# Patient Record
Sex: MALE | Race: WHITE
[De-identification: names, ages, dates, MRNs, and addresses within clinical notes are randomized per-mention and may not be internally consistent; named-entity substitution may affect disease eponyms.]

---

## 2021-08-12 NOTE — CONS
CONSULTATION



REASON FOR CONSULT:

Hypernatremia.



HISTORY OF PRESENT ILLNESS:

The patient is an 80-year-old male who was admitted from the nursing home as he was

found unresponsive.  It is difficult to obtain any history from the patient as he is

not arousable.  Family is not present at bedside.  The patient's sodium was noted to be

150 mEq/L.  He is currently unarousable but does withdraw to pain.  Chest x-ray was

suggestive of CHF, however, clinically patient is lying in bed comfortably with no

significant shortness of breath. His O2 sats 100% on 2 L nasal cannula.  Heart rate

noted to be 126 and 128 beats per minute.  Blood pressure is on the lower side with

systolic around 103 mmHg.  Earlier it was 99 systolic.  The patient has an indwelling

Wahl catheter which was just placed.  There is no history of fever or diarrhea noted.



PAST MEDICAL HISTORY:

Obtained from chart review, type 2 diabetes, dementia, CHF, atrial fibrillation.



SOCIAL HISTORY:

Negative for smoking, drug abuse or alcohol abuse.  The patient is currently at the

nursing home prior to admission.  Home medications included aspirin, Aricept,

Jardiance, Lopressor, potassium, Zocor, lisinopril, metformin.



ALLERGIES:

INCLUDE AMOXICILLIN.



REVIEW OF SYSTEMS:

Review of systems cannot be obtained mostly as per HPI.



EXAMINATION:

Patient is unarousable, although he withdraws to pain.  He does not open his eyes.

Blood pressure was 103/74, heart rate 127 per minute. He is afebrile.

Examination of the heart S1, S2.

Examination of the lungs, bilateral breath sounds are heard.

Abdomen is soft, nontender.

Examination of lower extremities shows no evidence of edema.

CNS exam shows patient only withdraws to pain.



LABS:

Show sodium 150, potassium 3.8, chloride 115, CO2 is 28, BUN 39, creatinine 1.09.

Lactic acid level 2.1, troponin 0.14.  Repeat lactic acid 3.5.



ASSESSMENT:

1. Hypernatremia associated with free water deficit.  We will start D5W.

2. Hypotension.  Rule out underlying sepsis.  UA shows 9 WBCs with 1+ protein.  The

    patient is maintained on empiric antibiotics.  Chest x-ray in the ER shows

    pulmonary vascular congestion, although clinically patient does not appear to be

    volume overloaded.

3. Mental status changes, associated with underlying dementia as well as electrolyte

    disturbance.

4. Chronic kidney disease.  Baseline creatinine appears to be around 1.09.  We do not

    have any prior labs available for comparison.

5. Lactic acidosis, possibly related to hypotension, hypoperfusion.  The patient is

    also on metformin which is currently on hold.



PLAN:

Start D5W at 70 mL an hour.  Repeat sodium at about 2:00 pm and 1 L of fluid bolus if

patient is hypotensive.



Thank you for this consultation.  We will continue to follow the patient with you

during his hospitalization.





MMALINL / SCOTTN: [id number] / Job#: [id number]

## 2021-08-12 NOTE — P.CNPUL
History of Present Illness


Consult date: 08/12/21


Requesting physician: Migue Schmitt


Reason for consult: abnormal CXR/CT


Chief complaint: Altered mental status


History of present illness: 





This is an 80-year-old male patient who was brought in from [facility] 

with altered mental status.  Apparently had previously been a no code patient 

but was moved back to a full code per his wife.  He was brought in after being 

found unresponsive at the nursing home.  Unknown length of time from his altered

mental status.  He is unresponsive in the emergency room and unable to provide 

any history.  Chest x-ray revealed changes of congestive heart failure.  EKG 

reveals sinus tachycardia with previous inferior wall myocardial infarction.  

White count 15.2.  Hemoglobin 13.7.  Sodium 150.  Potassium 3.8.  Chloride 115. 

Bicarb 28.  BUN 39.  Creatinine 1.09.  Glucose 322.  He was initiated on 0.9 

normal saline at 1:30 ML's per hour.  Antibiotics in the form of ceftriaxone and

azithromycin.  He received Lasix 40 mg IVP 1.  He is seen today in consultation

on the selective care unit.  He is currently unresponsive.  No response to loud 

verbal stimuli.  No response to painful stimuli.  Stat ABGs were ordered and was

found to have a pO2 of 149, pCO2 43, pH 7.45 on 30% FiO2.  Stat CT scan of the 

brain revealed degenerative and nonspecific white matter changes most typical 

emote ischemia.  Greater central ventricular dilation, can be associated with 

normal pressure hydrocephalus.  Neuro consult pending.





Review of Systems


ROS unobtainable: due to mental status





Past Medical History


Past Medical History: Atrial Fibrillation, Heart Failure, Dementia, Diabetes [place]

litus


History of Any Multi-Drug Resistant Organisms: None Reported


Past Psychological History: No Psychological Hx Reported


Smoking Status: Never smoker


Past Alcohol Use History: None Reported


Past Drug Use History: None Reported





Medications and Allergies


                                Home Medications











 Medication  Instructions  Recorded  Confirmed  Type


 


Aspirin 81 mg DAILY 08/12/21 08/12/21 History


 


Donepezil HCl [Aricept] 10 mg PO BID@0700,2000 08/12/21 08/12/21 History


 


Empagliflozin [Jardiance] 25 mg PO DAILY 08/12/21 08/12/21 History


 


Healthshake 1 dose PO TID@0800,1300,1900 08/12/21 08/12/21 History


 


Memantine HCl 10 mg PO BID@0700,2000 08/12/21 08/12/21 History


 


Metoprolol Tartrate [Lopressor] 75 mg PO TID@0700,1300,2000 08/12/21 08/12/21 

History


 


Potassium Chloride ER [K-Dur 20] 20 mg DAILY 08/12/21 08/12/21 History


 


Simvastatin [Zocor] 40 mg PO HS@2000 08/12/21 08/12/21 History


 


lisinopriL 10 mg PO AS DIRECTED 08/12/21 08/12/21 History


 


metFORMIN  mg PO BID 08/12/21 08/12/21 History








                                    Allergies











Allergy/AdvReac Type Severity Reaction Status Date / Time


 


amoxicillin Allergy  Unknown Verified 08/12/21 07:17














Physical Exam


Vitals: 


                                   Vital Signs











  Temp Pulse Pulse Resp BP BP Pulse Ox


 


 08/12/21 05:39    127 H    


 


 08/12/21 05:16  97.8 F   127 H  20   103/74  100


 


 08/12/21 04:56   126 H   22  119/89   100


 


 08/12/21 04:00   128 H   22  118/81   100


 


 08/12/21 03:30   127 H   22  100/69   100


 


 08/12/21 02:30   126 H   22  116/76   99


 


 08/12/21 02:00   126 H   22  112/75   99


 


 08/12/21 01:41  97.1 F L  126 H   22  113/76   97








                                Intake and Output











 08/11/21 08/12/21 08/12/21





 22:59 06:59 14:59


 


Other:   


 


  Voiding Method  Indwelling Catheter 


 


  # Bowel Movements  1 


 


  Weight  63.1 kg 














GENERAL EXAM: Obtunded 80-year-old male patient on 2 L nasal cannula.


HEAD: Normocephalic.


EYES: Normal reaction of pupils, equal size.


NOSE: Clear with pink turbinates.


THROAT: No erythema or exudates.


NECK: No masses, no JVD.


CHEST: No chest wall deformity.


LUNGS: Equal air entry with bilateral crackles in the posterior bases.


CVS: S1 and S2 normal with no audible murmur, regular rhythm.


ABDOMEN: No hepatosplenomegaly, normal bowel sounds, no guarding or rigidity.


SPINE: No scoliosis or deformity


SKIN: No rashes


CENTRAL NERVOUS SYSTEM: Obtunded, tone is normal in all 4 extremities.


EXTREMITIES: There is no peripheral edema.  No clubbing, no cyanosis.  

Peripheral pulses are intact.





Results





- Laboratory Findings


CBC and BMP: 


                                 08/12/21 01:56





                                 08/12/21 08:57


ABG











ABG pH  7.45  (7.35-7.45)   08/12/21  09:03    


 


ABG pCO2  43 mmHg (35-45)   08/12/21  09:03    


 


ABG pO2  149 mmHg ()  H  08/12/21  09:03    


 


ABG O2 Saturation  99.3 % (94-97)  H  08/12/21  09:03    





PT/INR, D-dimer











PT  11.5 sec (9.0-12.0)   08/12/21  01:56    


 


INR  1.1  (<1.2)   08/12/21  01:56    








Abnormal lab findings: 


                                  Abnormal Labs











  08/12/21 08/12/21 08/12/21





  01:56 01:56 01:56


 


WBC  15.2 H  


 


MCHC  30.4 L  


 


RDW  16.4 H  


 


Neutrophils #  13.5 H  


 


ABG pO2   


 


ABG HCO3   


 


ABG Total CO2   


 


ABG O2 Saturation   


 


Sodium   


 


Potassium    5.3 H


 


Chloride    114 H


 


BUN    37 H


 


Glucose    331 H


 


POC Glucose (mg/dL)   


 


Plasma Lactic Acid Braden   


 


Calcium   


 


Magnesium    2.4 H


 


Alkaline Phosphatase    132 H


 


Creatine Kinase    22 L


 


Troponin I   


 


Total Protein    5.9 L


 


Albumin    2.5 L


 


Urine Protein   1+ H 


 


Urine Glucose (UA)   4+ H 


 


Urine Blood   Trace H 


 


Urine RBC   8 H 


 


Urine WBC   9 H 


 


Urine Mucus   Rare H 














  08/12/21 08/12/21 08/12/21





  01:56 01:56 06:03


 


WBC   


 


MCHC   


 


RDW   


 


Neutrophils #   


 


ABG pO2   


 


ABG HCO3   


 


ABG Total CO2   


 


ABG O2 Saturation   


 


Sodium   


 


Potassium   


 


Chloride   


 


BUN   


 


Glucose   


 


POC Glucose (mg/dL)    386 H


 


Plasma Lactic Acid Braden  2.9 H*  


 


Calcium   


 


Magnesium   


 


Alkaline Phosphatase   


 


Creatine Kinase   


 


Troponin I   0.132 H* 


 


Total Protein   


 


Albumin   


 


Urine Protein   


 


Urine Glucose (UA)   


 


Urine Blood   


 


Urine RBC   


 


Urine WBC   


 


Urine Mucus   














  08/12/21 08/12/21 08/12/21





  06:04 06:04 08:57


 


WBC   


 


MCHC   


 


RDW   


 


Neutrophils #   


 


ABG pO2   


 


ABG HCO3   


 


ABG Total CO2   


 


ABG O2 Saturation   


 


Sodium    150 H


 


Potassium   


 


Chloride    115 H


 


BUN    39 H


 


Glucose    322 H


 


POC Glucose (mg/dL)   


 


Plasma Lactic Acid Braden   2.1 H* 


 


Calcium    8.3 L


 


Magnesium   


 


Alkaline Phosphatase   


 


Creatine Kinase   


 


Troponin I  0.131 H*  


 


Total Protein   


 


Albumin   


 


Urine Protein   


 


Urine Glucose (UA)   


 


Urine Blood   


 


Urine RBC   


 


Urine WBC   


 


Urine Mucus   














  08/12/21





  09:03


 


WBC 


 


MCHC 


 


RDW 


 


Neutrophils # 


 


ABG pO2  149 H


 


ABG HCO3  29 H


 


ABG Total CO2  31 H


 


ABG O2 Saturation  99.3 H


 


Sodium 


 


Potassium 


 


Chloride 


 


BUN 


 


Glucose 


 


POC Glucose (mg/dL) 


 


Plasma Lactic Acid Braden 


 


Calcium 


 


Magnesium 


 


Alkaline Phosphatase 


 


Creatine Kinase 


 


Troponin I 


 


Total Protein 


 


Albumin 


 


Urine Protein 


 


Urine Glucose (UA) 


 


Urine Blood 


 


Urine RBC 


 


Urine WBC 


 


Urine Mucus 














- Diagnostic Findings


Chest x-ray: image reviewed





Assessment and Plan


Assessment: 





1 Altered mental status of unclear etiology, unsure of baseline computed tomogra

phy scan of the brain reveals degenerative and nonspecific white matter changes 

most: Remote ischemia.  Greater central vent jugular dilation can be associated 

with normal pressure hydrocephalus.  Neurology consult pending.





2 Acute hypoxemic respiratory failure secondary to congestive heart failure, 

echocardiogram pending





3 Troponin leak





4 Hypernatremia





5 Hyperkalemia, improved





6 Hyperglycemia





7 Nursing home resident





Plan:





The patient was seen and evaluated by Dr. Pastor


Computed tomography scan of the brain and ABGs were ordered stat and reviewed


Chest x-ray and labs reviewed


Procalcitonin pending, currently on ceftriaxone and azithromycin


Discontinue 0.9 normal saline, change to D5W IV at 75 ML's per hour


Echocardiogram pending


Prognosis is poor


Unclear of his baseline status


Apparently had previously been a DNR/DNI CODE STATUS


Awaiting family members to arrive for further information and clarification





I, the cosigning physician, performed a history & physical examination of the 

patient. Lungs sounds with crackles bilateral bases.  Maintaining good O2 satu

rations in the 90s on 2 L/m per nasal cannula. I discussed the assessment and 

plan of care with my nurse practitioner, Suzan Dallas. I attest to the above 

consultation as dictated by her.


Time with Patient: Greater than 30

## 2021-08-12 NOTE — P.HPIM
History of Present Illness





Patient is a 80-year-old the male came in because patient is barely responsive. 

When I valid the patient patient does live ache answering minimal the questions.

 Patient is a nursing home resident.  Patient was transferred to nursing home 

after his inpatient hospitalization at an outside facility.  I'm unable to get 

much history from wife either.  As per the wife patient was eating patient 

became much less responsive and was sent in to the hospital patient had a chest 

x-ray, which looks like heart failure because of which I ordered a BNP which is 

highly elevated to 23,000 patient will be given a dose of Lasix.  IV fluids at 

this time.  Patient also found to have elevated white blood glucose of 322.  

Patient is receiving normal saline at 1 36/h which will be discontinued at this 

time patient was later switched to D5 half-normal saline which will risk and 

urine as well.  Patient had a computed tomography scan of the head which 

revealed degenerative changes with nonspecific white matter changes.  Patient 

does haven't by dilatation because of age-related went up to dilatation and 

probably dementia.  Neurology will evaluate the patient as well











REVIEW OF SYSTEMS: 


Unable to obtain due to his clinical condition








PHYSICAL EXAMINATION: 





GENERAL: Patient is bit arousable now and response now I believe he is answering

appropriately but unable to is his orientation, not in any acute distress.  Thin

built cachectic 


HEENT: Pupils are round and equally reacting to light. EOMI. No scleral icterus.

No conjunctival pallor. Normocephalic, atraumatic. No pharyngeal erythema. No 

thyromegaly. 


CARDIOVASCULAR: S1 and S2 present. No murmurs, rubs, or gallops. 


PULMONARY: Chest is clear to auscultation, no wheezing or crackles. 


ABDOMEN: Soft, nontender, nondistended, normoactive bowel sounds. No palpable 

organomegaly. 


MUSCULOSKELETAL: No joint swelling or deformity.


EXTREMITIES: No cyanosis, clubbing, or pedal edema. 


NEUROLOGICAL: Unable to assess mental status as mentioned above arousable and 

responsive


SKIN: No rashes. 





Assessment and plan


-Altered mental status, state of unresponsiveness: Probably encephalopathy 

related to volume overload considering his BNP and chest x-ray findings, IV 

fluids were discontinued and patient will be given a dose of Lasix, no clear 

evidence of infection patient is being treated for pneumonia but possibility of 

that is low if patient improves with Lasix and discontinue the antibiotics, 

patient is presently on azithromycin and Rocephin


-Acute hypoxic respiratory failure secondary to congestive heart failure 

echocardiogram is pending IV Lasix as mentioned above


-Mildly elevated troponin secondary to heart failure.


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


-Type 2 diabetes mellitus uncontrolled elevated blood sugars patient will be 

resumed on home regimen titration depending on his blood sugars.


-Known history of atrial fibrillation presently in atrial flutter patient will 

be restarted back on his metoprolol patient was given IV once he is able to 

tolerate by mouth diet patient will be started back on that oral metoprolol.  

Patient is presently not on any anticoagulation


-Possible dementia from the wife's history patient appears to have advanced 

dementia


-Leukocytosis reactive so far no clear source of infection


DVT prophylaxis: Patient will be started on subcutaneous heparin


-Patient is DO NOT RESUSCITATE discussed with the wife.





Past Medical History


Past Medical History: Atrial Fibrillation, Heart Failure, Dementia, Diabetes 

[place]


History of Any Multi-Drug Resistant Organisms: None Reported


Past Surgical History: No Surgical Hx Reported


Past Anesthesia/Blood Transfusion Reactions: No Reported Reaction


Past Psychological History: No Psychological Hx Reported


Smoking Status: Unknown if ever smoked


Past Alcohol Use History: None Reported


Past Drug Use History: None Reported





Medications and Allergies


                                Home Medications











 Medication  Instructions  Recorded  Confirmed  Type


 


Aspirin 81 mg DAILY 08/12/21 08/12/21 History


 


Donepezil HCl [Aricept] 10 mg PO BID@0700,2000 08/12/21 08/12/21 History


 


Empagliflozin [Jardiance] 25 mg PO DAILY 08/12/21 08/12/21 History


 


Healthshake 1 dose PO TID@0800,1300,1900 08/12/21 08/12/21 History


 


Memantine HCl 10 mg PO BID@0700,2000 08/12/21 08/12/21 History


 


Metoprolol Tartrate [Lopressor] 75 mg PO TID@0700,1300,2000 08/12/21 08/12/21 

History


 


Potassium Chloride ER [K-Dur 20] 20 mg DAILY 08/12/21 08/12/21 History


 


Simvastatin [Zocor] 40 mg PO HS@2000 08/12/21 08/12/21 History


 


lisinopriL 10 mg PO AS DIRECTED 08/12/21 08/12/21 History


 


metFORMIN  mg PO BID 08/12/21 08/12/21 History








                                    Allergies











Allergy/AdvReac Type Severity Reaction Status Date / Time


 


amoxicillin Allergy  Unknown Verified 08/12/21 07:17














Physical Exam


Vitals: 


                                   Vital Signs











  Temp Pulse Pulse Resp BP BP Pulse Ox


 


 08/12/21 14:00     18   


 


 08/12/21 12:00  97.5 F L   66  20   90/55  95


 


 08/12/21 11:36  97.5 F L   66  18   90/55  95


 


 08/12/21 08:00  97.8 F   120 H  20   92/64  100


 


 08/12/21 05:39    127 H    


 


 08/12/21 05:16  97.8 F   127 H  20   103/74  100


 


 08/12/21 04:56   126 H   22  119/89   100


 


 08/12/21 04:00   128 H   22  118/81   100


 


 08/12/21 03:30   127 H   22  100/69   100


 


 08/12/21 02:30   126 H   22  116/76   99


 


 08/12/21 02:00   126 H   22  112/75   99


 


 08/12/21 01:41  97.1 F L  126 H   22  113/76   97








                                Intake and Output











 08/11/21 08/12/21 08/12/21





 22:59 06:59 14:59


 


Intake Total   0


 


Output Total   1200


 


Balance   -1200


 


Intake:   


 


  Oral   0


 


Output:   


 


  Urine   1200


 


Other:   


 


  Voiding Method  Indwelling Catheter Indwelling Catheter


 


  # Bowel Movements  1 


 


  Weight  63.1 kg 63.1 kg














Results


CBC & Chem 7: 


                                 08/12/21 01:56





                                 08/12/21 12:49


Labs: 


                  Abnormal Lab Results - Last 24 Hours (Table)











  08/12/21 08/12/21 08/12/21 Range/Units





  01:56 01:56 01:56 


 


WBC  15.2 H    (3.8-10.6)  k/uL


 


MCHC  30.4 L    (31.0-37.0)  g/dL


 


RDW  16.4 H    (11.5-15.5)  %


 


Neutrophils #  13.5 H    (1.3-7.7)  k/uL


 


ABG pO2     ()  mmHg


 


ABG HCO3     (21-25)  mmol/L


 


ABG Total CO2     (19-24)  mmol/L


 


ABG O2 Saturation     (94-97)  %


 


Sodium     (137-145)  mmol/L


 


Potassium    5.3 H  (3.5-5.1)  mmol/L


 


Chloride    114 H  ()  mmol/L


 


BUN    37 H  (9-20)  mg/dL


 


Glucose    331 H  (74-99)  mg/dL


 


POC Glucose (mg/dL)     (75-99)  mg/dL


 


Plasma Lactic Acid Braden     (0.7-2.0)  mmol/L


 


Calcium     (8.4-10.2)  mg/dL


 


Magnesium    2.4 H  (1.6-2.3)  mg/dL


 


Alkaline Phosphatase    132 H  ()  U/L


 


Creatine Kinase    22 L  ()  U/L


 


Troponin I     (0.000-0.034)  ng/mL


 


Total Protein    5.9 L  (6.3-8.2)  g/dL


 


Albumin    2.5 L  (3.5-5.0)  g/dL


 


Urine Protein   1+ H   (Negative)  


 


Urine Glucose (UA)   4+ H   (Negative)  


 


Urine Blood   Trace H   (Negative)  


 


Urine RBC   8 H   (0-5)  /hpf


 


Urine WBC   9 H   (0-5)  /hpf


 


Urine Mucus   Rare H   (None)  /hpf














  08/12/21 08/12/21 08/12/21 Range/Units





  01:56 01:56 06:03 


 


WBC     (3.8-10.6)  k/uL


 


MCHC     (31.0-37.0)  g/dL


 


RDW     (11.5-15.5)  %


 


Neutrophils #     (1.3-7.7)  k/uL


 


ABG pO2     ()  mmHg


 


ABG HCO3     (21-25)  mmol/L


 


ABG Total CO2     (19-24)  mmol/L


 


ABG O2 Saturation     (94-97)  %


 


Sodium     (137-145)  mmol/L


 


Potassium     (3.5-5.1)  mmol/L


 


Chloride     ()  mmol/L


 


BUN     (9-20)  mg/dL


 


Glucose     (74-99)  mg/dL


 


POC Glucose (mg/dL)    386 H  (75-99)  mg/dL


 


Plasma Lactic Acid Braden  2.9 H*    (0.7-2.0)  mmol/L


 


Calcium     (8.4-10.2)  mg/dL


 


Magnesium     (1.6-2.3)  mg/dL


 


Alkaline Phosphatase     ()  U/L


 


Creatine Kinase     ()  U/L


 


Troponin I   0.132 H*   (0.000-0.034)  ng/mL


 


Total Protein     (6.3-8.2)  g/dL


 


Albumin     (3.5-5.0)  g/dL


 


Urine Protein     (Negative)  


 


Urine Glucose (UA)     (Negative)  


 


Urine Blood     (Negative)  


 


Urine RBC     (0-5)  /hpf


 


Urine WBC     (0-5)  /hpf


 


Urine Mucus     (None)  /hpf














  08/12/21 08/12/21 08/12/21 Range/Units





  06:04 06:04 08:57 


 


WBC     (3.8-10.6)  k/uL


 


MCHC     (31.0-37.0)  g/dL


 


RDW     (11.5-15.5)  %


 


Neutrophils #     (1.3-7.7)  k/uL


 


ABG pO2     ()  mmHg


 


ABG HCO3     (21-25)  mmol/L


 


ABG Total CO2     (19-24)  mmol/L


 


ABG O2 Saturation     (94-97)  %


 


Sodium     (137-145)  mmol/L


 


Potassium     (3.5-5.1)  mmol/L


 


Chloride     ()  mmol/L


 


BUN     (9-20)  mg/dL


 


Glucose     (74-99)  mg/dL


 


POC Glucose (mg/dL)     (75-99)  mg/dL


 


Plasma Lactic Acid Braden   2.1 H*   (0.7-2.0)  mmol/L


 


Calcium     (8.4-10.2)  mg/dL


 


Magnesium     (1.6-2.3)  mg/dL


 


Alkaline Phosphatase     ()  U/L


 


Creatine Kinase     ()  U/L


 


Troponin I  0.131 H*   0.141 H*  (0.000-0.034)  ng/mL


 


Total Protein     (6.3-8.2)  g/dL


 


Albumin     (3.5-5.0)  g/dL


 


Urine Protein     (Negative)  


 


Urine Glucose (UA)     (Negative)  


 


Urine Blood     (Negative)  


 


Urine RBC     (0-5)  /hpf


 


Urine WBC     (0-5)  /hpf


 


Urine Mucus     (None)  /hpf














  08/12/21 08/12/21 08/12/21 Range/Units





  08:57 09:03 09:47 


 


WBC     (3.8-10.6)  k/uL


 


MCHC     (31.0-37.0)  g/dL


 


RDW     (11.5-15.5)  %


 


Neutrophils #     (1.3-7.7)  k/uL


 


ABG pO2   149 H   ()  mmHg


 


ABG HCO3   29 H   (21-25)  mmol/L


 


ABG Total CO2   31 H   (19-24)  mmol/L


 


ABG O2 Saturation   99.3 H   (94-97)  %


 


Sodium  150 H    (137-145)  mmol/L


 


Potassium     (3.5-5.1)  mmol/L


 


Chloride  115 H    ()  mmol/L


 


BUN  39 H    (9-20)  mg/dL


 


Glucose  322 H    (74-99)  mg/dL


 


POC Glucose (mg/dL)     (75-99)  mg/dL


 


Plasma Lactic Acid Braden    3.5 H*  (0.7-2.0)  mmol/L


 


Calcium  8.3 L    (8.4-10.2)  mg/dL


 


Magnesium     (1.6-2.3)  mg/dL


 


Alkaline Phosphatase     ()  U/L


 


Creatine Kinase     ()  U/L


 


Troponin I     (0.000-0.034)  ng/mL


 


Total Protein     (6.3-8.2)  g/dL


 


Albumin     (3.5-5.0)  g/dL


 


Urine Protein     (Negative)  


 


Urine Glucose (UA)     (Negative)  


 


Urine Blood     (Negative)  


 


Urine RBC     (0-5)  /hpf


 


Urine WBC     (0-5)  /hpf


 


Urine Mucus     (None)  /hpf














  08/12/21 08/12/21 Range/Units





  11:59 12:49 


 


WBC    (3.8-10.6)  k/uL


 


MCHC    (31.0-37.0)  g/dL


 


RDW    (11.5-15.5)  %


 


Neutrophils #    (1.3-7.7)  k/uL


 


ABG pO2    ()  mmHg


 


ABG HCO3    (21-25)  mmol/L


 


ABG Total CO2    (19-24)  mmol/L


 


ABG O2 Saturation    (94-97)  %


 


Sodium    (137-145)  mmol/L


 


Potassium    (3.5-5.1)  mmol/L


 


Chloride    ()  mmol/L


 


BUN    (9-20)  mg/dL


 


Glucose    (74-99)  mg/dL


 


POC Glucose (mg/dL)  303 H   (75-99)  mg/dL


 


Plasma Lactic Acid Braden   2.3 H*  (0.7-2.0)  mmol/L


 


Calcium    (8.4-10.2)  mg/dL


 


Magnesium    (1.6-2.3)  mg/dL


 


Alkaline Phosphatase    ()  U/L


 


Creatine Kinase    ()  U/L


 


Troponin I    (0.000-0.034)  ng/mL


 


Total Protein    (6.3-8.2)  g/dL


 


Albumin    (3.5-5.0)  g/dL


 


Urine Protein    (Negative)  


 


Urine Glucose (UA)    (Negative)  


 


Urine Blood    (Negative)  


 


Urine RBC    (0-5)  /hpf


 


Urine WBC    (0-5)  /hpf


 


Urine Mucus    (None)  /hpf














Thrombosis Risk Factor Assmnt





- Choose All That Apply


Any of the Below Risk Factors Present?: No


Other Risk Factors: Yes


Each Risk Factor Represents 3 Points: Age 75 years or older


Other congenital or acquired thrombophilia - If yes, enter type in comment: No


Thrombosis Risk Factor Assessment Total Risk Factor Score: 3


Thrombosis Risk Factor Assessment Level: Moderate Risk

## 2021-08-12 NOTE — ECHOF
Referral Reason:elevated trop, atrial flutter



MEASUREMENTS

--------

HEIGHT: 167.6 cm

WEIGHT: 63.0 kg

BP: 103/74

RVIDd:   2.7 cm     (< 3.3)

Ao Diam:   3.5 cm     (2.0 - 3.7)

AV Cusp:   1.9 cm     (1.5 - 2.6)

LA Diam:   2.1 cm     (2.7 - 3.8)

MV E Ramses:   0.51 m/s

MV DecT:   142 ms

MV A Ramses:   0.51 m/s

MV E/A Ratio:   1.00 







FINDINGS

--------

Resting tachycardia (HR>100bpm).   Repeat echo again when tachycardia improved if accurate EF assessm
ent desired.

This was a technically difficult study with suboptimal views. No plax or  apical views. Study taken f
rom subcoastals.

Suboptimal test with Lumason. Unable to calculate EF.

The right ventricle is normal in size.

Lumason used

Aortic valve is trileaflet and is mildly thickened.

The mitral valve leaflets are mildly thickened.   There is trace mitral regurgitation.

The tricuspid valve appears structurally normal.   Trace tricuspid regurgitation present.   Right nunu
tricular systolic pressure is normal at < 35 mmHg.

The pulmonic valve was not well visualized.

Normal inferior vena cava with normal inspiratory collapse consistent with estimated right atrial pre
ssure of  5 mmHg.



CONCLUSIONS

--------

1. Resting tachycardia (HR>100bpm).

2. Repeat echo again when tachycardia improved if accurate EF assessment desired.

3. This was a technically difficult study with suboptimal views.

4. No plax or apical views. Study taken from subcoastals.

5. Suboptimal test with Lumason. Unable to calculate EF.

6. Lumason used

7. Aortic valve is trileaflet and is mildly thickened.

8. The mitral valve leaflets are mildly thickened.

9. There is trace mitral regurgitation.

10. Trace tricuspid regurgitation present.





SONOGRAPHER: Angela Cook RDCS

## 2021-08-12 NOTE — P.CNNES
History of Present Illness


Consult date: 08/12/21


Requesting physician: Suzan Dallas


Reason for Consult: Altered mental status, lethargy


History of Present Illness: 





Patient is a 80-year-old male with history of dementia, currently living in a 

nursing facility was brought to the hospital by ambulance for altered mental 

status.  According to EMS flow sheet when they arrived, found patient laying in 

the bed, unresponsive.  The nurse at the facility was not sure how long the 

patient has been "this unresponsive, but he also has dementia".  The nurse also 

stated that patient was on hospice until a few days ago when patient's wife 

decided to make him full code again.  Patient has history of sepsis and chronic 

respiratory failure.  Patient was alert and oriented 1, GCS of 5, unable to 

answer any questions appropriately.  The staff at the nursing home believed that

patient is taking "last decline, before actively dying".  Patient's blood 

pressure at the scene was 136/77, pulse rate 125, respiration 22, saturation 

100% in GCS of 7.  Patient's blood glucose was 402.  Patient's vitals on arrival

blood pressure 113/76, pulse rate 126, temperature 97.1.  Patient had computed 

tomography scan of the head, which revealed degenerative and nonspecific white 

matter changes, most typical of remote ischemia.  Greater central ventricular 

dilation, can be associated with normal pressure hydrocephalus, correlate 

clinically.  EKG shows sinus rhythm with short ME.  Right superior axis 

deviation.  Patient's blood test shows a BBC 15.2 hemoglobin 13.7, platelets 

188.  PT/PTT normal.  Sodium 145 potassium 5.3, BUN 37, creatinine 1.07.  

Lactate was 2.9.  Hepatic panel normal.  Ammonia normal 9.  CK 22.  Troponin 

mildly elevated 0.132.  TSH normal 1.67.  UA is essentially negative.  Patient's

ABG showed pH 7.45, pCO2 43, pO2 149 and saturation 99.3% on 30% FiO2.





Patient's wife mentions that he was started on treatment for dementia in 2019.  

He was at first started on Aricept, and after short while he was placed on 

Namenda.  She believes that he improved "95% better"with combination of these 2 

medications.  However his symptoms again got worse between the last Christmas 

and new year of 2021.  When he would forget easily and would not remember where 

the bathroom is.  He would pass urine in the garbage can.  Prior to winter he 

was going up and down stairs.  Patient also has been recently hospitalized 

multiple times with recently for GI bleed.  He has been in this nursing home 

since 08/04/2021.  Prior to that he was living in his home with his wife, and 

son Clarence.  At home he goes to bathroom, used to scoot from couch to the bedside

commode.  He would hang onto wife while going downstairs.  Even in June he was 

walking a little but has been progressively getting downhill.  He occasionally 

hallucinates, once he was seeing a woman in the closet.  Patient does remember 

name of his wife, and son Clarence, who lives with him.  Other kids he does not see

to often and does not see grandchildren.  He has never been oriented well to 

date and time since he became old.





Review of Systems


ROS unobtainable: due to mental status





Past Medical History


Past Medical History: Atrial Fibrillation, Heart Failure, Dementia, Diabetes 

Mellitus


History of Any Multi-Drug Resistant Organisms: None Reported


Past Surgical History: No Surgical Hx Reported


Past Anesthesia/Blood Transfusion Reactions: No Reported Reaction


Past Psychological History: No Psychological Hx Reported


Smoking Status: Unknown if ever smoked


Past Alcohol Use History: None Reported


Past Drug Use History: None Reported





Medications and Allergies


                                Home Medications











 Medication  Instructions  Recorded  Confirmed  Type


 


Aspirin 81 mg DAILY 08/12/21 08/12/21 History


 


Donepezil HCl [Aricept] 10 mg PO BID@0700,2000 08/12/21 08/12/21 History


 


Empagliflozin [Jardiance] 25 mg PO DAILY 08/12/21 08/12/21 History


 


Healthshake 1 dose PO TID@0800,1300,1900 08/12/21 08/12/21 History


 


Memantine HCl 10 mg PO BID@0700,2000 08/12/21 08/12/21 History


 


Metoprolol Tartrate [Lopressor] 75 mg PO TID@0700,1300,2000 08/12/21 08/12/21 

History


 


Potassium Chloride ER [K-Dur 20] 20 mg DAILY 08/12/21 08/12/21 History


 


Simvastatin [Zocor] 40 mg PO HS@2000 08/12/21 08/12/21 History


 


lisinopriL 10 mg PO AS DIRECTED 08/12/21 08/12/21 History


 


metFORMIN  mg PO BID 08/12/21 08/12/21 History








                                    Allergies











Allergy/AdvReac Type Severity Reaction Status Date / Time


 


amoxicillin Allergy  Unknown Verified 08/12/21 07:17














Physical Examination





- Vital Signs


Vital Signs: 


                                   Vital Signs











  Temp Pulse Pulse Resp BP BP Pulse Ox


 


 08/12/21 14:00     18   


 


 08/12/21 12:00  97.5 F L   66  20   90/55  95


 


 08/12/21 11:36  97.5 F L   66  18   90/55  95


 


 08/12/21 08:00  97.8 F   120 H  20   92/64  100


 


 08/12/21 05:39    127 H    


 


 08/12/21 05:16  97.8 F   127 H  20   103/74  100


 


 08/12/21 04:56   126 H   22  119/89   100


 


 08/12/21 04:00   128 H   22  118/81   100


 


 08/12/21 03:30   127 H   22  100/69   100


 


 08/12/21 02:30   126 H   22  116/76   99


 


 08/12/21 02:00   126 H   22  112/75   99


 


 08/12/21 01:41  97.1 F L  126 H   22  113/76   97








                                Intake and Output











 08/11/21 08/12/21 08/12/21





 22:59 06:59 14:59


 


Intake Total   0


 


Output Total   1200


 


Balance   -1200


 


Intake:   


 


  Oral   0


 


Output:   


 


  Urine   1200


 


Other:   


 


  Voiding Method  Indwelling Catheter Indwelling Catheter


 


  # Bowel Movements  1 


 


  Weight  63.1 kg 63.1 kg














On examination patient is an elderly  male, who appears somewhat thin 

build, slightly emaciated.  Patient response to his wife better to myself.  He 

could not tell what month or year is it.  He knows that he is in the hospital 

but does not know the name.  He does not able to tell his age.  Is slightly rasp

y, but no aphasia or dysarthria.  It was limited speech.  His pupils are round 

and reacting, visual fields could not be tested.  Face is symmetric.  Tongue and

 shoulders could not be tested.  Hearing appears decreased.  Muscle strength 

patient squeezed right hand better than the left.  However on manually lifting 

his arms, he will bring both arms down equally to the bed.  He felt nailbed 

pressure in the feet, stating "ouch it hurts".  However he did not withdraw his 

legs.  Reflexes are very diminished and plantars are downgoing.  Tone is equal. 

 Cerebellar functions and gait cannot be tested.





Results





- Laboratory Findings


CBC and BMP: 


                                 08/12/21 01:56





                                 08/12/21 12:49


Abnormal Lab Findings: 


                                  Abnormal Labs











  08/12/21 08/12/21 08/12/21





  01:56 01:56 01:56


 


WBC  15.2 H  


 


MCHC  30.4 L  


 


RDW  16.4 H  


 


Neutrophils #  13.5 H  


 


ABG pO2   


 


ABG HCO3   


 


ABG Total CO2   


 


ABG O2 Saturation   


 


Sodium   


 


Potassium    5.3 H


 


Chloride    114 H


 


BUN    37 H


 


Glucose    331 H


 


POC Glucose (mg/dL)   


 


Plasma Lactic Acid Braden   


 


Calcium   


 


Magnesium    2.4 H


 


Alkaline Phosphatase    132 H


 


Creatine Kinase    22 L


 


Troponin I   


 


Total Protein    5.9 L


 


Albumin    2.5 L


 


Urine Protein   1+ H 


 


Urine Glucose (UA)   4+ H 


 


Urine Blood   Trace H 


 


Urine RBC   8 H 


 


Urine WBC   9 H 


 


Urine Mucus   Rare H 














  08/12/21 08/12/21 08/12/21





  01:56 01:56 06:03


 


WBC   


 


MCHC   


 


RDW   


 


Neutrophils #   


 


ABG pO2   


 


ABG HCO3   


 


ABG Total CO2   


 


ABG O2 Saturation   


 


Sodium   


 


Potassium   


 


Chloride   


 


BUN   


 


Glucose   


 


POC Glucose (mg/dL)    386 H


 


Plasma Lactic Acid Braden  2.9 H*  


 


Calcium   


 


Magnesium   


 


Alkaline Phosphatase   


 


Creatine Kinase   


 


Troponin I   0.132 H* 


 


Total Protein   


 


Albumin   


 


Urine Protein   


 


Urine Glucose (UA)   


 


Urine Blood   


 


Urine RBC   


 


Urine WBC   


 


Urine Mucus   














  08/12/21 08/12/21 08/12/21





  06:04 06:04 08:57


 


WBC   


 


MCHC   


 


RDW   


 


Neutrophils #   


 


ABG pO2   


 


ABG HCO3   


 


ABG Total CO2   


 


ABG O2 Saturation   


 


Sodium   


 


Potassium   


 


Chloride   


 


BUN   


 


Glucose   


 


POC Glucose (mg/dL)   


 


Plasma Lactic Acid Braden   2.1 H* 


 


Calcium   


 


Magnesium   


 


Alkaline Phosphatase   


 


Creatine Kinase   


 


Troponin I  0.131 H*   0.141 H*


 


Total Protein   


 


Albumin   


 


Urine Protein   


 


Urine Glucose (UA)   


 


Urine Blood   


 


Urine RBC   


 


Urine WBC   


 


Urine Mucus   














  08/12/21 08/12/21 08/12/21





  08:57 09:03 09:47


 


WBC   


 


MCHC   


 


RDW   


 


Neutrophils #   


 


ABG pO2   149 H 


 


ABG HCO3   29 H 


 


ABG Total CO2   31 H 


 


ABG O2 Saturation   99.3 H 


 


Sodium  150 H  


 


Potassium   


 


Chloride  115 H  


 


BUN  39 H  


 


Glucose  322 H  


 


POC Glucose (mg/dL)   


 


Plasma Lactic Acid Braden    3.5 H*


 


Calcium  8.3 L  


 


Magnesium   


 


Alkaline Phosphatase   


 


Creatine Kinase   


 


Troponin I   


 


Total Protein   


 


Albumin   


 


Urine Protein   


 


Urine Glucose (UA)   


 


Urine Blood   


 


Urine RBC   


 


Urine WBC   


 


Urine Mucus   














  08/12/21 08/12/21





  11:59 12:49


 


WBC  


 


MCHC  


 


RDW  


 


Neutrophils #  


 


ABG pO2  


 


ABG HCO3  


 


ABG Total CO2  


 


ABG O2 Saturation  


 


Sodium  


 


Potassium  


 


Chloride  


 


BUN  


 


Glucose  


 


POC Glucose (mg/dL)  303 H 


 


Plasma Lactic Acid Braden   2.3 H*


 


Calcium  


 


Magnesium  


 


Alkaline Phosphatase  


 


Creatine Kinase  


 


Troponin I  


 


Total Protein  


 


Albumin  


 


Urine Protein  


 


Urine Glucose (UA)  


 


Urine Blood  


 


Urine RBC  


 


Urine WBC  


 


Urine Mucus  














Assessment and Plan


Assessment: 





* Altered mental status, likely due to toxic metabolic encephalopathy.


* Advanced dementia.


* Possible volume overload, CHF, possible pneumonia.


* Diabetes, not well controlled.


* Mildly elevated troponin.


* Known history of atrial fibrillation, currently not on anticoagulation.


Plan: 





* Patient has advanced dementia.  Continue Aricept 10 mg and Namenda 10 mg twice

   a day.


* Patient's has atrial fibrillation, currently only on aspirin 81 mg.  Also on 

  Lipitor 20 mg Would defer need for anticoagulation to IM.


* Treatment of various medical conditions as per IM.


* Patient currently on azithromycin and Rocephin.  Pulmonary also following.


* Echocardiogram pending


* Check B12, folate.  TSH is normal.


* EEG.


* Discussed with patient's wife in detail.

## 2021-08-12 NOTE — CT
EXAMINATION TYPE: CT brain wo con

 

DATE OF EXAM: 8/12/2021

 

COMPARISON: None

 

HISTORY: altered mental status, unresponsive

 

CT DLP: 996 mGycm

Automated exposure control for dose reduction was used.

 

FINDINGS: 

There is a moderate to severe degenerative change of the greater central component. No acute hemorrha
ge or mass effect. Diffuse low-attenuation the white matter is nonspecific. Calvarium is intact. Orbi
ts are symmetric. Sinuses are clear.

 

Craniocervical junction maintained. Sella turcica has a normal appearance..

 

IMPRESSION: 

DEGENERATIVE AND NONSPECIFIC WHITE MATTER CHANGES MOST TYPICAL REMOTE ISCHEMIA. GREATER CENTRAL VENTR
ICULAR DILATION CAN BE ASSOCIATED WITH NORMAL PRESSURE HYDROCEPHALUS CORRELATE CLINICALLY.

## 2021-08-12 NOTE — XR
EXAMINATION TYPE: XR chest 1V

 

DATE OF EXAM: 8/12/2021

 

COMPARISON: NONE

 

HISTORY: Weakness

 

TECHNIQUE: Single view

 

FINDINGS: Heart is enlarged. There is pulmonary vascular congestion and pulmonary edema. There is sli
ght blunting of the costophrenic angles. There are chest leads.

 

IMPRESSION: Changes consistent with congestive heart failure. RDS is possible.

## 2021-08-12 NOTE — P.CRDCN
History of Present Illness


Consult date: 08/12/21


Requesting physician: Migue Schmitt


Reason for Consult (text): 


eladia





Chief complaint: altered mental status, decreased responsiveness


History of present illness: 


This is an 80-year-old gentleman who presented from [facility] with 

unresponsiveness.  It is unclear how long he's been unresponsive and it is 

unclear what his baseline mental status is.  He apparently up until recently was

a no code however the wife changed his status to a full code.  His history and 

HPI were obtained from the chart.  There is also no family available at this 

time although the wife is apparently on her way.  Reviewing the medications it 

appears patient has a history of dementia, hyperlipidemia, hypertension and 

diabetes.  Labs on admission showed a white blood cell count of 15,000, sodium 

150, BUN 39, creatinine 1.09, magnesium 2.4.  Plasma lactic acid level 2.9.  Her

consult for elevated troponins which came back at 0.132 and 0.131.  NT proBNP 

was also elevated at 23,000 above the patient does not appear to be in failure. 

There is no evidence of edema no evidence of orthopnea and no evidence of 

respiratory distress.  His oxygen saturation is  on 2 L via nasal cannula.

 EKG on admission was read as sinus tachycardia with short [place] however upon 

review appears to be atrial tachycardia with 2 to one conduction.  Patient is 

currently in atrial flutter with 2-1 conduction on telemetry.  Heart rate is in 

the 120s.








Past Medical History


Past Medical History: Atrial Fibrillation, Heart Failure, Dementia, Diabetes 

Mellitus


History of Any Multi-Drug Resistant Organisms: None Reported


Past Psychological History: No Psychological Hx Reported


Smoking Status: Never smoker


Past Alcohol Use History: None Reported


Past Drug Use History: None Reported





Medications and Allergies


                                Home Medications











 Medication  Instructions  Recorded  Confirmed  Type


 


Aspirin 81 mg DAILY 08/12/21 08/12/21 History


 


Donepezil HCl [Aricept] 10 mg PO BID@0700,2000 08/12/21 08/12/21 History


 


Empagliflozin [Jardiance] 25 mg PO DAILY 08/12/21 08/12/21 History


 


Healthshake 1 dose PO TID@0800,1300,1900 08/12/21 08/12/21 History


 


Memantine HCl 10 mg PO BID@0700,2000 08/12/21 08/12/21 History


 


Metoprolol Tartrate [Lopressor] 75 mg PO TID@0700,1300,2000 08/12/21 08/12/21 

History


 


Potassium Chloride ER [K-Dur 20] 20 mg DAILY 08/12/21 08/12/21 History


 


Simvastatin [Zocor] 40 mg PO HS@2000 08/12/21 08/12/21 History


 


lisinopriL 10 mg PO AS DIRECTED 08/12/21 08/12/21 History


 


metFORMIN  mg PO BID 08/12/21 08/12/21 History








                                    Allergies











Allergy/AdvReac Type Severity Reaction Status Date / Time


 


amoxicillin Allergy  Unknown Verified 08/12/21 07:17














Physical Exam


Vitals: 


                                   Vital Signs











  Temp Pulse Pulse Resp BP BP Pulse Ox


 


 08/12/21 05:39    127 H    


 


 08/12/21 05:16  97.8 F   127 H  20   103/74  100


 


 08/12/21 04:56   126 H   22  119/89   100


 


 08/12/21 04:00   128 H   22  118/81   100


 


 08/12/21 03:30   127 H   22  100/69   100


 


 08/12/21 02:30   126 H   22  116/76   99


 


 08/12/21 02:00   126 H   22  112/75   99


 


 08/12/21 01:41  97.1 F L  126 H   22  113/76   97








                                Intake and Output











 08/11/21 08/12/21 08/12/21





 22:59 06:59 14:59


 


Other:   


 


  Voiding Method  Indwelling Catheter 


 


  # Bowel Movements  1 


 


  Weight  63.1 kg 











PHYSICAL EXAMINATION: This is a 80-year-old male in no apparent distress at the 

time of my examination.





VITAL SIGNS: Blood pressure 103/74, heart rate 127, respirations 20, temp 

97.8F. Patient is 100 % on 2 L via nasal cannula. 





HEENT: Head is atraumatic, normocephalic. Pupils are equal, round. Sclerae 

anicteric. Conjunctivae are clear. Mucous membranes of the mouth are moist. Neck

 is supple. There is no elevated jugular venous pressure. No carotid bruit is 

heard.


 


CHEST EXAMINATION: Clear to auscultation bilaterally with shallow respirations. 

 No wheezes rales or rhonchi. Respirations even and nonlabored.


 


HEART EXAMINATION:  Heart regular, tachycardia noted, positive S1 and S2.  No 

S3. No S4.  With a soft systolic murmur at the base.


 





ABDOMEN: Soft, nontender. Bowel sounds are heard. No organomegaly noted.


 


EXTREMITIES: 2+ peripheral pulses with no evidence of peripheral edema and no 

calf tenderness noted.


 


NEUROLOGIC EXAMINATION: Patient withdraws to painful stimuli.


 











Results





                                 08/12/21 01:56





                                 08/12/21 08:57


                                 Cardiac Enzymes











  08/12/21 08/12/21 08/12/21 Range/Units





  01:56 01:56 06:04 


 


AST  31    (17-59)  U/L


 


Troponin I   0.132 H*  0.131 H*  (0.000-0.034)  ng/mL








                                   Coagulation











  08/12/21 Range/Units





  01:56 


 


PT  11.5  (9.0-12.0)  sec


 


APTT  22.6  (22.0-30.0)  sec








                                       CBC











  08/12/21 Range/Units





  01:56 


 


WBC  15.2 H  (3.8-10.6)  k/uL


 


RBC  4.76  (4.30-5.90)  m/uL


 


Hgb  13.7  (13.0-17.5)  gm/dL


 


Hct  45.1  (39.0-53.0)  %


 


Plt Count  188  (150-450)  k/uL








                          Comprehensive Metabolic Panel











  08/12/21 08/12/21 Range/Units





  01:56 08:57 


 


Sodium  145  150 H  (137-145)  mmol/L


 


Potassium  5.3 H  3.8  (3.5-5.1)  mmol/L


 


Chloride  114 H  115 H  ()  mmol/L


 


Carbon Dioxide  26  28  (22-30)  mmol/L


 


BUN  37 H  39 H  (9-20)  mg/dL


 


Creatinine  1.07  1.09  (0.66-1.25)  mg/dL


 


Glucose  331 H  322 H  (74-99)  mg/dL


 


Calcium  8.4  8.3 L  (8.4-10.2)  mg/dL


 


AST  31   (17-59)  U/L


 


ALT  14   (4-49)  U/L


 


Alkaline Phosphatase  132 H   ()  U/L


 


Total Protein  5.9 L   (6.3-8.2)  g/dL


 


Albumin  2.5 L   (3.5-5.0)  g/dL








                               Current Medications











Generic Name Dose Route Start Last Admin





  Trade Name Freq  PRN Reason Stop Dose Admin


 


Sodium Chloride  1,000 mls @ 130 mls/hr  08/12/21 04:00  08/12/21 04:05





  Saline 0.9%  IV   Not Given





  .[id number] YUE  


 


Ceftriaxone Sodium 1 gm/  50 mls @ 100 mls/hr  08/12/21 16:00 





  Sodium Chloride  IVPB  





  Q12H YUE  


 


Azithromycin 500 mg/ Sodium  250 mls @ 250 mls/hr  08/13/21 05:00 





  Chloride  IVPB  





  DAILY@0500 YUE  


 


Insulin Aspart  0 unit  08/12/21 07:30  08/12/21 06:24





  Insulin Aspart (Novolog) 100 Unit/Ml Vial  SQ   7 unit





  ACHS YUE   Administration





  Protocol  


 


Metoprolol Tartrate  5 mg  08/12/21 10:30 





  Metoprolol Tartrate 5 Mg/5 Ml Vial  IVP  





  Q8HR YUE  


 


Naloxone HCl  0.2 mg  08/12/21 03:49 





  Naloxone 0.4 Mg/Ml 1 Ml Vial  IV  





  Q2M PRN  





  Opioid Reversal  


 


Ondansetron HCl  4 mg  08/12/21 03:49 





  Ondansetron 4 Mg/2 Ml Vial  IVP  





  Q8HR PRN  





  Nausea And Vomiting  


 


Pantoprazole Sodium  40 mg  08/12/21 09:00  08/12/21 10:15





  Pantoprazole 40 Mg/10 Ml Vial  IV   40 mg





  DAILY YUE   Administration








                                Intake and Output











 08/11/21 08/12/21 08/12/21





 22:59 06:59 14:59


 


Other:   


 


  Voiding Method  Indwelling Catheter 


 


  # Bowel Movements  1 


 


  Weight  63.1 kg 








                                        





                                 08/12/21 01:56 





                                 08/12/21 08:57 











EKG Interpretations (text)


Atrial tachycardia with 2 to one conduction








Assessment and Plan


Assessment: 


#1 decreased level of consciousness


#2 atrial flutter of unknown duration


#3 hypertension


#4 Dementia


#5 hyperlipidemia


#6 diabetes mellitus type 2


#7 elevated troponins not consistent with acute coronary event





Plan: 


From cardiology's perspective we will add IV Lopressor.  We'll obtain a 2-D echo

 with Doppler.  We will check thyroid function studies.  Await the arrival of 

the wife to determine patient's baseline.  We'll continue to follow the patient 

provide further recommendations accordingly.





NP note has been reviewed, I agree with a documented findings and plan of care. 

 Patient was seen and examined.

## 2021-08-12 NOTE — ED
Altered Mental Status HPI





- General


Stated Complaint: AMS


Time Seen by Provider: 08/12/21 01:38


Source: RN notes reviewed, old records reviewed


Mode of arrival: EMS


Limitations: no limitations





- History of Present Illness


Initial Comments: 





This is an 80-year-old male to the ER for evaluation.  Patient is formerly a no 

code patient but has recently moved to a full code patient.  Patient presents 

with an unknown length of time from the unresponsive.  Patient is unresponsive 

here in the ER and unable to provide history.


MD Complaint: altered mental status, decreased responsiveness, weakness


-: unknown


Severity: severe


Consistency of Symptoms: getting worse, constant


Context: history of similar presentation


Associated Symptoms: weakness


Treatments Prior to Arrival: IV fluid





- Related Data


                                    Allergies











Allergy/AdvReac Type Severity Reaction Status Date / Time


 


amoxicillin Allergy  Unknown Verified 08/12/21 01:53














Review of Systems


ROS Statement: 


Those systems with pertinent positive or pertinent negative responses have been 

documented in the HPI.





ROS Other: All systems not noted in ROS Statement are negative.





General Exam


General appearance: alert, in no apparent distress


Head exam: Present: atraumatic, normocephalic, normal inspection


Eye exam: Present: normal appearance, PERRL, EOMI.  Absent: scleral icterus, 

conjunctival injection, periorbital swelling


ENT exam: Present: normal exam, mucous membranes moist


Neck exam: Present: normal inspection.  Absent: tenderness, meningismus, 

lymphadenopathy


Respiratory exam: Present: normal lung sounds bilaterally.  Absent: respiratory 

distress, wheezes, rales, rhonchi, stridor


Cardiovascular Exam: Present: regular rate, normal rhythm, normal heart sounds. 

Absent: systolic murmur, diastolic murmur, rubs, gallop, clicks


GI/Abdominal exam: Present: soft, normal bowel sounds.  Absent: distended, 

tenderness, guarding, rebound, rigid


Extremities exam: Present: normal inspection, full ROM, normal capillary refill.

 Absent: tenderness, pedal edema, joint swelling, calf tenderness


Back exam: Present: normal inspection


Neurological exam: Present: alert, oriented X3, CN II-XII intact


Psychiatric exam: Present: normal affect, normal mood


Skin exam: Present: warm, dry, intact, normal color.  Absent: rash





Course


                                   Vital Signs











  08/12/21 08/12/21 08/12/21





  01:41 02:00 02:30


 


Temperature 97.1 F L  


 


Pulse Rate 126 H 126 H 126 H


 


Respiratory 22 22 22





Rate   


 


Blood Pressure 113/76 112/75 116/76


 


O2 Sat by Pulse 97 99 99





Oximetry   














- Reevaluation(s)


Reevaluation #1: 





08/12/21 02:15


Medical record is reviewed





Medical Decision Making





- Lab Data


Result diagrams: 


                                 08/12/21 01:56





                                 08/12/21 01:56


                                   Lab Results











  08/12/21 08/12/21 08/12/21 Range/Units





  01:56 01:56 01:56 


 


WBC  15.2 H    (3.8-10.6)  k/uL


 


RBC  4.76    (4.30-5.90)  m/uL


 


Hgb  13.7    (13.0-17.5)  gm/dL


 


Hct  45.1    (39.0-53.0)  %


 


MCV  94.7    (80.0-100.0)  fL


 


MCH  28.8    (25.0-35.0)  pg


 


MCHC  30.4 L    (31.0-37.0)  g/dL


 


RDW  16.4 H    (11.5-15.5)  %


 


Plt Count  188    (150-450)  k/uL


 


MPV  8.8    


 


Neutrophils %  89    %


 


Lymphocytes %  7    %


 


Monocytes %  2    %


 


Eosinophils %  1    %


 


Basophils %  0    %


 


Neutrophils #  13.5 H    (1.3-7.7)  k/uL


 


Lymphocytes #  1.1    (1.0-4.8)  k/uL


 


Monocytes #  0.4    (0-1.0)  k/uL


 


Eosinophils #  0.1    (0-0.7)  k/uL


 


Basophils #  0.1    (0-0.2)  k/uL


 


Hypochromasia  Moderate    


 


Anisocytosis  Slight    


 


PT   11.5   (9.0-12.0)  sec


 


INR   1.1   (<1.2)  


 


APTT   22.6   (22.0-30.0)  sec


 


Sodium     (137-145)  mmol/L


 


Potassium     (3.5-5.1)  mmol/L


 


Chloride     ()  mmol/L


 


Carbon Dioxide     (22-30)  mmol/L


 


Anion Gap     mmol/L


 


BUN     (9-20)  mg/dL


 


Creatinine     (0.66-1.25)  mg/dL


 


Est GFR (CKD-EPI)AfAm     (>60 ml/min/1.73 sqM)  


 


Est GFR (CKD-EPI)NonAf     (>60 ml/min/1.73 sqM)  


 


Glucose     (74-99)  mg/dL


 


Plasma Lactic Acid Braden     (0.7-2.0)  mmol/L


 


Calcium     (8.4-10.2)  mg/dL


 


Phosphorus     (2.5-4.5)  mg/dL


 


Magnesium     (1.6-2.3)  mg/dL


 


Total Bilirubin     (0.2-1.3)  mg/dL


 


AST     (17-59)  U/L


 


ALT     (4-49)  U/L


 


Alkaline Phosphatase     ()  U/L


 


Ammonia     (<30)  umol/L


 


Creatine Kinase     ()  U/L


 


Troponin I     (0.000-0.034)  ng/mL


 


NT-Pro-B Natriuret Pep     pg/mL


 


Total Protein     (6.3-8.2)  g/dL


 


Albumin     (3.5-5.0)  g/dL


 


Urine Color    Yellow  


 


Urine Appearance    Clear  (Clear)  


 


Urine pH    5.5  (5.0-8.0)  


 


Ur Specific Gravity    1.032  (1.001-1.035)  


 


Urine Protein    1+ H  (Negative)  


 


Urine Glucose (UA)    4+ H  (Negative)  


 


Urine Ketones    Negative  (Negative)  


 


Urine Blood    Trace H  (Negative)  


 


Urine Nitrite    Negative  (Negative)  


 


Urine Bilirubin    Negative  (Negative)  


 


Urine Urobilinogen    <2.0  (<2.0)  mg/dL


 


Ur Leukocyte Esterase    Negative  (Negative)  


 


Urine RBC    8 H  (0-5)  /hpf


 


Urine WBC    9 H  (0-5)  /hpf


 


Ur Squamous Epith Cells    <1  (0-4)  /hpf


 


Urine Mucus    Rare H  (None)  /hpf














  08/12/21 08/12/21 08/12/21 Range/Units





  01:56 01:56 01:56 


 


WBC     (3.8-10.6)  k/uL


 


RBC     (4.30-5.90)  m/uL


 


Hgb     (13.0-17.5)  gm/dL


 


Hct     (39.0-53.0)  %


 


MCV     (80.0-100.0)  fL


 


MCH     (25.0-35.0)  pg


 


MCHC     (31.0-37.0)  g/dL


 


RDW     (11.5-15.5)  %


 


Plt Count     (150-450)  k/uL


 


MPV     


 


Neutrophils %     %


 


Lymphocytes %     %


 


Monocytes %     %


 


Eosinophils %     %


 


Basophils %     %


 


Neutrophils #     (1.3-7.7)  k/uL


 


Lymphocytes #     (1.0-4.8)  k/uL


 


Monocytes #     (0-1.0)  k/uL


 


Eosinophils #     (0-0.7)  k/uL


 


Basophils #     (0-0.2)  k/uL


 


Hypochromasia     


 


Anisocytosis     


 


PT     (9.0-12.0)  sec


 


INR     (<1.2)  


 


APTT     (22.0-30.0)  sec


 


Sodium  145    (137-145)  mmol/L


 


Potassium  5.3 H    (3.5-5.1)  mmol/L


 


Chloride  114 H    ()  mmol/L


 


Carbon Dioxide  26    (22-30)  mmol/L


 


Anion Gap  5    mmol/L


 


BUN  37 H    (9-20)  mg/dL


 


Creatinine  1.07    (0.66-1.25)  mg/dL


 


Est GFR (CKD-EPI)AfAm  75    (>60 ml/min/1.73 sqM)  


 


Est GFR (CKD-EPI)NonAf  65    (>60 ml/min/1.73 sqM)  


 


Glucose  331 H    (74-99)  mg/dL


 


Plasma Lactic Acid Braden   2.9 H*   (0.7-2.0)  mmol/L


 


Calcium  8.4    (8.4-10.2)  mg/dL


 


Phosphorus  3.3    (2.5-4.5)  mg/dL


 


Magnesium  2.4 H    (1.6-2.3)  mg/dL


 


Total Bilirubin  0.4    (0.2-1.3)  mg/dL


 


AST  31    (17-59)  U/L


 


ALT  14    (4-49)  U/L


 


Alkaline Phosphatase  132 H    ()  U/L


 


Ammonia   <9   (<30)  umol/L


 


Creatine Kinase  22 L    ()  U/L


 


Troponin I    0.132 H*  (0.000-0.034)  ng/mL


 


NT-Pro-B Natriuret Pep     pg/mL


 


Total Protein  5.9 L    (6.3-8.2)  g/dL


 


Albumin  2.5 L    (3.5-5.0)  g/dL


 


Urine Color     


 


Urine Appearance     (Clear)  


 


Urine pH     (5.0-8.0)  


 


Ur Specific Gravity     (1.001-1.035)  


 


Urine Protein     (Negative)  


 


Urine Glucose (UA)     (Negative)  


 


Urine Ketones     (Negative)  


 


Urine Blood     (Negative)  


 


Urine Nitrite     (Negative)  


 


Urine Bilirubin     (Negative)  


 


Urine Urobilinogen     (<2.0)  mg/dL


 


Ur Leukocyte Esterase     (Negative)  


 


Urine RBC     (0-5)  /hpf


 


Urine WBC     (0-5)  /hpf


 


Ur Squamous Epith Cells     (0-4)  /hpf


 


Urine Mucus     (None)  /hpf














  08/12/21 Range/Units





  01:56 


 


WBC   (3.8-10.6)  k/uL


 


RBC   (4.30-5.90)  m/uL


 


Hgb   (13.0-17.5)  gm/dL


 


Hct   (39.0-53.0)  %


 


MCV   (80.0-100.0)  fL


 


MCH   (25.0-35.0)  pg


 


MCHC   (31.0-37.0)  g/dL


 


RDW   (11.5-15.5)  %


 


Plt Count   (150-450)  k/uL


 


MPV   


 


Neutrophils %   %


 


Lymphocytes %   %


 


Monocytes %   %


 


Eosinophils %   %


 


Basophils %   %


 


Neutrophils #   (1.3-7.7)  k/uL


 


Lymphocytes #   (1.0-4.8)  k/uL


 


Monocytes #   (0-1.0)  k/uL


 


Eosinophils #   (0-0.7)  k/uL


 


Basophils #   (0-0.2)  k/uL


 


Hypochromasia   


 


Anisocytosis   


 


PT   (9.0-12.0)  sec


 


INR   (<1.2)  


 


APTT   (22.0-30.0)  sec


 


Sodium   (137-145)  mmol/L


 


Potassium   (3.5-5.1)  mmol/L


 


Chloride   ()  mmol/L


 


Carbon Dioxide   (22-30)  mmol/L


 


Anion Gap   mmol/L


 


BUN   (9-20)  mg/dL


 


Creatinine   (0.66-1.25)  mg/dL


 


Est GFR (CKD-EPI)AfAm   (>60 ml/min/1.73 sqM)  


 


Est GFR (CKD-EPI)NonAf   (>60 ml/min/1.73 sqM)  


 


Glucose   (74-99)  mg/dL


 


Plasma Lactic Acid Braden   (0.7-2.0)  mmol/L


 


Calcium   (8.4-10.2)  mg/dL


 


Phosphorus   (2.5-4.5)  mg/dL


 


Magnesium   (1.6-2.3)  mg/dL


 


Total Bilirubin   (0.2-1.3)  mg/dL


 


AST   (17-59)  U/L


 


ALT   (4-49)  U/L


 


Alkaline Phosphatase   ()  U/L


 


Ammonia   (<30)  umol/L


 


Creatine Kinase   ()  U/L


 


Troponin I   (0.000-0.034)  ng/mL


 


NT-Pro-B Natriuret Pep  17395  pg/mL


 


Total Protein   (6.3-8.2)  g/dL


 


Albumin   (3.5-5.0)  g/dL


 


Urine Color   


 


Urine Appearance   (Clear)  


 


Urine pH   (5.0-8.0)  


 


Ur Specific Gravity   (1.001-1.035)  


 


Urine Protein   (Negative)  


 


Urine Glucose (UA)   (Negative)  


 


Urine Ketones   (Negative)  


 


Urine Blood   (Negative)  


 


Urine Nitrite   (Negative)  


 


Urine Bilirubin   (Negative)  


 


Urine Urobilinogen   (<2.0)  mg/dL


 


Ur Leukocyte Esterase   (Negative)  


 


Urine RBC   (0-5)  /hpf


 


Urine WBC   (0-5)  /hpf


 


Ur Squamous Epith Cells   (0-4)  /hpf


 


Urine Mucus   (None)  /hpf














Disposition


Clinical Impression: 


 Altered mental status, ARDS (adult respiratory distress syndrome), CHF 

(congestive heart failure), Dehydration





Disposition: ADMITTED AS IP TO THIS HOSP


Condition: Fair


Is patient prescribed a controlled substance at d/c from ED?: No


Referrals: 


Jordan Burrell MD [Primary Care Provider] - 1-2 days

## 2021-08-13 NOTE — P.PN
Subjective


Progress Note Date: 08/13/21


Principal diagnosis: 





Altered mental status, lethargy





This is an 80-year-old male patient who was brought in from [facility] 

with altered mental status.  Apparently had previously been a no code patient 

but was moved back to a full code per his wife.  He was brought in after being 

found unresponsive at the nursing home.  Unknown length of time from his altered

mental status.  He is unresponsive in the emergency room and unable to provide 

any history.  Chest x-ray revealed changes of congestive heart failure.  EKG 

reveals sinus tachycardia with previous inferior wall myocardial infarction.  

White count 15.2.  Hemoglobin 13.7.  Sodium 150.  Potassium 3.8.  Chloride 115. 

Bicarb 28.  BUN 39.  Creatinine 1.09.  Glucose 322.  He was initiated on 0.9 

normal saline at 1:30 ML's per hour.  Antibiotics in the form of ceftriaxone and

azithromycin.  He received Lasix 40 mg IVP 1.  He is seen today in consultation

on the selective care unit.  He is currently unresponsive.  No response to loud 

verbal stimuli.  No response to painful stimuli.  Stat ABGs were ordered and was

found to have a pO2 of 149, pCO2 43, pH 7.45 on 30% FiO2.  Stat CT scan of the 

brain revealed degenerative and nonspecific white matter changes most typical 

emote ischemia.  Greater central ventricular dilation, can be associated with 

normal pressure hydrocephalus.  Neuro consult pending.





The patient is seen today 08/13/2021 in follow-up on the selective care unit.  

He is a bit more awake and alert today.  Following some simple commands.  

Maintaining O2 saturations in the upper 90s on 3 L/m per nasal cannula.  He is 

afebrile.  Blood cultures positive for gram-positive cocci in groups.  White 

count 7.4.  Hemoglobin 11.7.  Sodium 144.  Potassium 3.5.  Creatinine 0.81.  

Glucose 148.  He is in atrial fibrillation with rapid ventricular response.  

Initiated on an amiodarone drip currently at 1 mg/m.  He remains on antibiotics 

in the form of ceftriaxone and azithromycin.





Objective





- Vital Signs


Vital signs: 


                                   Vital Signs











Temp  98.1 F   08/13/21 03:29


 


Pulse  133 H  08/13/21 06:56


 


Resp  20   08/13/21 03:29


 


BP  97/66   08/13/21 06:56


 


Pulse Ox  99   08/13/21 03:29








                                 Intake & Output











 08/12/21 08/13/21 08/13/21





 18:59 06:59 18:59


 


Intake Total 0  0


 


Output Total 1200 1500 


 


Balance -1200 -1500 0


 


Weight 63.1 kg 49.5 kg 49.5 kg


 


Intake:   


 


  Oral 0  0


 


Output:   


 


  Urine 1200 1500 


 


Other:   


 


  Voiding Method Indwelling Catheter Indwelling Catheter 


 


  # Bowel Movements  1 














- Exam





GENERAL EXAM: Awake, following some simple commands, 80-year-old male patient on

3 L nasal cannula.


HEAD: Normocephalic.


EYES: Normal reaction of pupils, equal size.


NOSE: Clear with pink turbinates.


THROAT: No erythema or exudates.


NECK: No masses, no JVD.


CHEST: No chest wall deformity.


LUNGS: Equal air entry with bilateral crackles in the posterior bases.


CVS: S1 and S2 normal with no audible murmur, regular rhythm.


ABDOMEN: No hepatosplenomegaly, normal bowel sounds, no guarding or rigidity.


SPINE: No scoliosis or deformity


SKIN: No rashes


CENTRAL NERVOUS SYSTEM: Dementia, tone is normal in all 4 extremities.


EXTREMITIES: There is no peripheral edema.  No clubbing, no cyanosis.  

Peripheral pulses are intact.





- Labs


CBC & Chem 7: 


                                 08/13/21 07:44





                                 08/13/21 07:44


Labs: 


                  Abnormal Lab Results - Last 24 Hours (Table)











  08/12/21 08/12/21 08/12/21 Range/Units





  08:57 11:59 12:49 


 


RBC     (4.30-5.90)  m/uL


 


Hgb     (13.0-17.5)  gm/dL


 


Hct     (39.0-53.0)  %


 


MCHC     (31.0-37.0)  g/dL


 


RDW     (11.5-15.5)  %


 


Plt Count     (150-450)  k/uL


 


Chloride     ()  mmol/L


 


BUN     (9-20)  mg/dL


 


Glucose     (74-99)  mg/dL


 


POC Glucose (mg/dL)   303 H   (75-99)  mg/dL


 


Plasma Lactic Acid Braden    2.3 H*  (0.7-2.0)  mmol/L


 


Calcium     (8.4-10.2)  mg/dL


 


Total Protein     (6.3-8.2)  g/dL


 


Albumin     (3.5-5.0)  g/dL


 


Procalcitonin  0.19 H    (0.02-0.09)  ng/mL














  08/12/21 08/12/21 08/12/21 Range/Units





  15:59 17:37 20:18 


 


RBC     (4.30-5.90)  m/uL


 


Hgb     (13.0-17.5)  gm/dL


 


Hct     (39.0-53.0)  %


 


MCHC     (31.0-37.0)  g/dL


 


RDW     (11.5-15.5)  %


 


Plt Count     (150-450)  k/uL


 


Chloride     ()  mmol/L


 


BUN     (9-20)  mg/dL


 


Glucose     (74-99)  mg/dL


 


POC Glucose (mg/dL)   205 H  170 H  (75-99)  mg/dL


 


Plasma Lactic Acid Braden  2.2 H*    (0.7-2.0)  mmol/L


 


Calcium     (8.4-10.2)  mg/dL


 


Total Protein     (6.3-8.2)  g/dL


 


Albumin     (3.5-5.0)  g/dL


 


Procalcitonin     (0.02-0.09)  ng/mL














  08/13/21 08/13/21 08/13/21 Range/Units





  06:11 07:44 07:44 


 


RBC   4.00 L   (4.30-5.90)  m/uL


 


Hgb   11.7 L   (13.0-17.5)  gm/dL


 


Hct   37.9 L   (39.0-53.0)  %


 


MCHC   30.9 L   (31.0-37.0)  g/dL


 


RDW   16.2 H   (11.5-15.5)  %


 


Plt Count   132 L   (150-450)  k/uL


 


Chloride    114 H  ()  mmol/L


 


BUN    36 H  (9-20)  mg/dL


 


Glucose    148 H  (74-99)  mg/dL


 


POC Glucose (mg/dL)  125 H    (75-99)  mg/dL


 


Plasma Lactic Acid Braden     (0.7-2.0)  mmol/L


 


Calcium    8.0 L  (8.4-10.2)  mg/dL


 


Total Protein    4.9 L  (6.3-8.2)  g/dL


 


Albumin    2.0 L  (3.5-5.0)  g/dL


 


Procalcitonin     (0.02-0.09)  ng/mL








                      Microbiology - Last 24 Hours (Table)











 08/12/21 01:56 Blood Culture Gram Stain - Preliminary





 Blood 


 


 08/12/21 01:56 Blood Culture - Final





 Blood 














Assessment and Plan


Assessment: 





1 Altered mental status of unclear etiology, unsure of baseline with history of 

dementia computed tomography scan of the brain reveals degenerative and 

nonspecific white matter changes most typically remote ischemia.  Greater 

central vent jugular dilation can be associated with normal pressure 

hydrocephalus.  Neurology states toxic metabolic encephalopathy.





2 Acute hypoxemic respiratory failure secondary to congestive heart failure, 

echocardiogram pending





3 Bacteremia secondary to gram positive cocci in clusters





4 Hypernatremia, improved current sodium 144





5 Hyperkalemia, improved current potassium 3.5





6 Hyperglycemia, improved current glucose 148





7 Atrial fibrillation with a rapid ventricular response, currently on amiodarone

drip





8 Dementia





9 Nursing home resident





Plan:





The patient was seen and evaluated by Dr. Pastor


Preliminary blood culture gram-positive cocci in clusters


Procalcitonin 0.19, currently on ceftriaxone and azithromycin


Echocardiogram inconclusive due to tachycardia


Amiodarone drip for A. fib RVR


Prognosis is poor


DNR/DNI CODE STATUS


We will continue to follow





I, the cosigning physician, performed a history & physical examination of the 

patient. Lungs sounds with crackles bilateral bases.  Maintaining good O2 

saturations in the 90s on 3 L/m per nasal cannula. I discussed the assessment 

and plan of care with my nurse practitioner, Suzan Dallas. I attest to the above 

note as dictated by her.

## 2021-08-13 NOTE — P.PN
Subjective





Patient is a 80-year-old the male came in because patient is barely responsive. 

When I valid the patient patient does live ache answering minimal the questions.

 Patient is a nursing home resident.  Patient was transferred to nursing home 

after his inpatient hospitalization at an outside facility.  I'm unable to get 

much history from wife either.  As per the wife patient was eating patient 

became much less responsive and was sent in to the hospital patient had a chest 

x-ray, which looks like heart failure because of which I ordered a BNP which is 

highly elevated to 23,000 patient will be given a dose of Lasix.  IV fluids at 

this time.  Patient also found to have elevated white blood glucose of 322.  

Patient is receiving normal saline at 1 36/h which will be discontinued at this 

time patient was later switched to D5 half-normal saline which will risk and 

urine as well.  Patient had a computed tomography scan of the head which 

revealed degenerative changes with nonspecific white matter changes.  Patient 

does haven't by dilatation because of age-related went up to dilatation and 

probably dementia.  Neurology will evaluate the patient as well





08/13/2021





Patient mental status remains the same.  Patient blood cultures came back pos

itive for gram-positive cocci in clusters can be contamination as a tone in came

back at 0.19 which indicates low chance for any bacterial infection.  Patient's 

creatinine did improve with IV Lasix.  Echocardiogram did not show any more IVC 

dilatation because of which the patient will not be given any Lasix at this 

time.  Patient is in A. fib atrial fibrillation for which patient was started on

amiodarone by cardiology.  Patient was evaluated by neurology, infectious 

disease is currently being consulted as well because of bacteremia, repeat blood

cultures will be obtain for today and tomorrow.  Patient blood sugars are bit 

better today as patient is not eating be probably need to back off on his 

diabetic regimen.  Patient will be just started on sliding scale insulin, 

Giardia and's will be held.





Review of systems unable to obtain due to his clinical condition





All inpatient medications were reviewed and appropriate changes in these 

medications as dictated in the interval history and assessment and plan.








PHYSICAL EXAMINATION: 





GENERAL: Patient is bit arousable now and response now I believe he is answering

appropriately but unable to is his orientation, not in any acute distress.  Thin

built cachectic 


HEENT: Pupils are round and equally reacting to light. EOMI. No scleral icterus.

No conjunctival pallor. Normocephalic, atraumatic. No pharyngeal erythema. No 

thyromegaly. 


CARDIOVASCULAR: S1 and S2 present. No murmurs, rubs, or gallops. 


PULMONARY: Chest is clear to auscultation, no wheezing or crackles. 


ABDOMEN: Soft, nontender, nondistended, normoactive bowel sounds. No palpable 

organomegaly. 


MUSCULOSKELETAL: No joint swelling or deformity.


EXTREMITIES: No cyanosis, clubbing, or pedal edema. 


NEUROLOGICAL: Unable to assess mental status as mentioned above arousable and 

responsive


SKIN: No rashes. 





Assessment and plan


-Altered mental status, state of unresponsiveness: She and has bacteremia unsure

whether this is a contamination patient will be started on vancomycin, 

discontinue azithromycin and Rocephin patient's pro calcitonin is only 1.9 as 

mentioned above altered mental status can be related to atrial fibrillation and 

heart failure as well


-Acute hypoxic respiratory failure , patient is saturating well now probably 

related to congestive heart failure, unable to assess ejection fraction and 

echocardiogram patient is in atrial fibrillation for which patient is on 

amiodarone, patient doesn't have any dilated IVC, will not continue any more 

Lasix hypoxia appears to have improved since starting it  99% on 2 L patient's 

blood pressure is borderline.


-Paroxysmal A. fib: Presently rapid regular rate patient was started on 

amiodarone, patient is on anticoagulation which will be resumed


-Mildly elevated troponin secondary to heart failure.


-Type 2 diabetes mellitus , patient blood sugars are well controlled at this 

time but my concern is hypoglycemia as patient is not eating hold off on Giardia

and's and patient will be started on sliding scale insulin


-Possible dementia from the wife's history patient appears to have advanced 

dementia


-Leukocytosis reactive so far no clear source of infection


DVT prophylaxis: Patient will be started on subcutaneous heparin


-Patient is DO NOT RESUSCITATE discussed with the wife.





Objective





- Vital Signs


Vital signs: 


                                   Vital Signs











Temp  98.1 F   08/13/21 03:29


 


Pulse  133 H  08/13/21 06:56


 


Resp  20   08/13/21 03:29


 


BP  97/66   08/13/21 06:56


 


Pulse Ox  99   08/13/21 03:29








                                 Intake & Output











 08/12/21 08/13/21 08/13/21





 18:59 06:59 18:59


 


Intake Total 0  0


 


Output Total 1200 1500 


 


Balance -1200 -1500 0


 


Weight 63.1 kg 49.5 kg 49.5 kg


 


Intake:   


 


  Oral 0  0


 


Output:   


 


  Urine 1200 1500 


 


Other:   


 


  Voiding Method Indwelling Catheter Indwelling Catheter 


 


  # Bowel Movements  1 














- Labs


CBC & Chem 7: 


                                 08/13/21 07:44





                                 08/13/21 07:44


Labs: 


                  Abnormal Lab Results - Last 24 Hours (Table)











  08/12/21 08/12/21 08/12/21 Range/Units





  08:57 11:59 12:49 


 


RBC     (4.30-5.90)  m/uL


 


Hgb     (13.0-17.5)  gm/dL


 


Hct     (39.0-53.0)  %


 


MCHC     (31.0-37.0)  g/dL


 


RDW     (11.5-15.5)  %


 


Plt Count     (150-450)  k/uL


 


Chloride     ()  mmol/L


 


BUN     (9-20)  mg/dL


 


Glucose     (74-99)  mg/dL


 


POC Glucose (mg/dL)   303 H   (75-99)  mg/dL


 


Plasma Lactic Acid Braden    2.3 H*  (0.7-2.0)  mmol/L


 


Calcium     (8.4-10.2)  mg/dL


 


Total Protein     (6.3-8.2)  g/dL


 


Albumin     (3.5-5.0)  g/dL


 


Procalcitonin  0.19 H    (0.02-0.09)  ng/mL














  08/12/21 08/12/21 08/12/21 Range/Units





  15:59 17:37 20:18 


 


RBC     (4.30-5.90)  m/uL


 


Hgb     (13.0-17.5)  gm/dL


 


Hct     (39.0-53.0)  %


 


MCHC     (31.0-37.0)  g/dL


 


RDW     (11.5-15.5)  %


 


Plt Count     (150-450)  k/uL


 


Chloride     ()  mmol/L


 


BUN     (9-20)  mg/dL


 


Glucose     (74-99)  mg/dL


 


POC Glucose (mg/dL)   205 H  170 H  (75-99)  mg/dL


 


Plasma Lactic Acid Braden  2.2 H*    (0.7-2.0)  mmol/L


 


Calcium     (8.4-10.2)  mg/dL


 


Total Protein     (6.3-8.2)  g/dL


 


Albumin     (3.5-5.0)  g/dL


 


Procalcitonin     (0.02-0.09)  ng/mL














  08/13/21 08/13/21 08/13/21 Range/Units





  06:11 07:44 07:44 


 


RBC   4.00 L   (4.30-5.90)  m/uL


 


Hgb   11.7 L   (13.0-17.5)  gm/dL


 


Hct   37.9 L   (39.0-53.0)  %


 


MCHC   30.9 L   (31.0-37.0)  g/dL


 


RDW   16.2 H   (11.5-15.5)  %


 


Plt Count   132 L   (150-450)  k/uL


 


Chloride    114 H  ()  mmol/L


 


BUN    36 H  (9-20)  mg/dL


 


Glucose    148 H  (74-99)  mg/dL


 


POC Glucose (mg/dL)  125 H    (75-99)  mg/dL


 


Plasma Lactic Acid Braden     (0.7-2.0)  mmol/L


 


Calcium    8.0 L  (8.4-10.2)  mg/dL


 


Total Protein    4.9 L  (6.3-8.2)  g/dL


 


Albumin    2.0 L  (3.5-5.0)  g/dL


 


Procalcitonin     (0.02-0.09)  ng/mL














  08/13/21 Range/Units





  11:42 


 


RBC   (4.30-5.90)  m/uL


 


Hgb   (13.0-17.5)  gm/dL


 


Hct   (39.0-53.0)  %


 


MCHC   (31.0-37.0)  g/dL


 


RDW   (11.5-15.5)  %


 


Plt Count   (150-450)  k/uL


 


Chloride   ()  mmol/L


 


BUN   (9-20)  mg/dL


 


Glucose   (74-99)  mg/dL


 


POC Glucose (mg/dL)  159 H  (75-99)  mg/dL


 


Plasma Lactic Acid Braden   (0.7-2.0)  mmol/L


 


Calcium   (8.4-10.2)  mg/dL


 


Total Protein   (6.3-8.2)  g/dL


 


Albumin   (3.5-5.0)  g/dL


 


Procalcitonin   (0.02-0.09)  ng/mL








                      Microbiology - Last 24 Hours (Table)











 08/12/21 01:56 Blood Culture Gram Stain - Preliminary





 Blood 


 


 08/12/21 01:56 Blood Culture - Final





 Blood

## 2021-08-13 NOTE — EEG
ELECTROENCEPHALOGRAM REPORT



DATE OF SERVICE:

08/13/2021



PREAMBLE:

This is an 80-year-old male with dementia and altered mental status.  This study is

performed to evaluate for encephalopathy rule out any epileptiform activity.



EEG FINDINGS:

This is a 21 channel routine EEG recording in a patient utilizing 10/20 international

system with referential and bipolar montages.  The background consists of disorganized

mixed frequencies of 4- 7 hertz theta, with some occasional alpha activity.  Background

does not seem to be reactive to eye opening or closing as different stages of sleep

were not seen.  Photic driving response was not seen.  No focal or generalized

epileptiform activity was seen.



IMPRESSION:

This is an abnormal EEG due to disorganized background and mild-to-moderate background

slowing.  This is suggestive of generalized cerebral dysfunction as can be seen with

toxic metabolic encephalopathy or due to diffuse structural brain abnormality.  No

epileptiform activity was seen.





MMALINL / PIPO: [id number] / Job#: [id number]

## 2021-08-13 NOTE — PN
PROGRESS NOTE



Patient is seen for followup for hypernatremia.  Serum sodium was 150 yesterday.

Repeat sodium later on yesterday was 137.  Patient was started on half-normal saline;

however, it appears that he got short of breath and was given a dose of Lasix.  IV

fluids are now discontinued.  The patient does have underlying dementia, but his

mentation was significantly impaired on admission and he was quite unresponsive.  This

morning patient is opening his eyes and does communicate.  He is having an EEG done

currently.  Serum sodium is at 144.



PHYSICAL EXAMINATION:

On examination, blood pressure is 93/63, heart rate 130 per minute. He is afebrile.

EXAMINATION OF THE HEART: S1 and S2.

EXAMINATION OF LUNGS: Bilateral breath sounds are heard. Decreased breath sounds at the

bases.

ABDOMEN:  Soft, nontender.

LOWER EXTREMITIES: Examination of lower extremities shows no evidence of edema.

CNS EXAM: CNS exam shows patient is moving all 4 extremities. He does communicate but

is confused.



LABS:

Labs show sodium 144, potassium 3.5, chloride 114, BUN 36, creatinine 0.8, hemoglobin

11.7 g/dL.



ASSESSMENT:

1. Hypernatremia associated with free water deficit, currently improved.  Patient is

    status post half-normal saline for a short period of time, which was eventually

    discontinued secondary to concerns for volume overload.  Clinically patient does

    not appear to be volume-overloaded at this point.

2. Underlying dementia.

3. Mental status changes, currently improved.

4. Atrial fibrillation with rapid ventricular response, maintained on amiodarone drip.



PLAN:

May continue off of IV fluids.  Monitor electrolytes closely.  Will see the patient on

an as-needed basis.





MMODL / IJN: [id number] / Job#: [id number] No

## 2021-08-13 NOTE — P.CONS
History of Present Illness





- Reason for Consult


Consult date: 08/13/21


Bacteremia


Requesting physician: Carlos Woodruff





- Chief Complaint


unresponsive x 1 day





- History of Present Illness


Patient is 80-year-old  male nursing home resident patient was brought 

to the hospital yesterday after the patient was found to be unresponsive for the

day that he was brought to the hospital no clear history of any seizure activity

nausea or vomiting however did have decreased oral intake per the daughter 

present at the bedside who provides most of the history patient on presentation 

to the hospital was afebrile and no fever has been recorded subsequently patient

did have a tachycardia but no significant hypoxemia patient did have vaginal 

15.23 subsequent normalized to 7.4 today did have elevated BUN and creatinine 

lactic acid was elevated procalcitonin 0.19 liver enzymes are normal patient did

have a chest x-ray she describes a sister with congestive heart failure RDS is 

possible patient has been treated with multiple consultants patient blood 

culture drawn in the ER came back with just gram-positive cocci for the patient 

has been started on vancomycin prior to that the patient was on Rocephin and 

Zithromax infectious was consulted for further management of antibiotic therapy 

most information has been obtained from review the chart and talking nursing 

staff as the patient himself was unable provide reliable history, patient 

currently do not have any open wounds as per talking with the nursing staff and 

did not have any IV access











Review of Systems


Positive points has been mentioned in HPI complete review could not be obtained 

because of his underlying mental status








Past Medical History


Past Medical History: Atrial Fibrillation, Heart Failure, Dementia, Diabetes 

Mellitus


History of Any Multi-Drug Resistant Organisms: None Reported


Past Surgical History: No Surgical Hx Reported


Past Anesthesia/Blood Transfusion Reactions: No Reported Reaction


Past Psychological History: No Psychological Hx Reported


Smoking Status: Unknown if ever smoked


Past Alcohol Use History: None Reported


Past Drug Use History: None Reported





Medications and Allergies


                                Home Medications











 Medication  Instructions  Recorded  Confirmed  Type


 


Aspirin 81 mg DAILY 08/12/21 08/12/21 History


 


Donepezil HCl [Aricept] 10 mg PO BID@0700,2000 08/12/21 08/12/21 History


 


Empagliflozin [Jardiance] 25 mg PO DAILY 08/12/21 08/12/21 History


 


Healthshake 1 dose PO TID@0800,1300,1900 08/12/21 08/12/21 History


 


Memantine HCl 10 mg PO BID@0700,2000 08/12/21 08/12/21 History


 


Metoprolol Tartrate [Lopressor] 75 mg PO TID@0700,1300,2000 08/12/21 08/12/21 

History


 


Potassium Chloride ER [K-Dur 20] 20 mg DAILY 08/12/21 08/12/21 History


 


Simvastatin [Zocor] 40 mg PO HS@2000 08/12/21 08/12/21 History


 


lisinopriL 10 mg PO AS DIRECTED 08/12/21 08/12/21 History


 


metFORMIN  mg PO BID 08/12/21 08/12/21 History








                                    Allergies











Allergy/AdvReac Type Severity Reaction Status Date / Time


 


amoxicillin Allergy  Unknown Verified 08/12/21 07:17














Physical Exam


Vitals: 


                                   Vital Signs











  Temp Pulse Resp BP Pulse Ox


 


 08/13/21 12:00  98.1 F  133 H  20  103/65  100


 


 08/13/21 08:00  98.0 F  133 H  20  92/62  99


 


 08/13/21 06:56   133 H   97/66 


 


 08/13/21 06:54   134 H   93/63 


 


 08/13/21 06:49   133 H   91/43 


 


 08/13/21 03:29  98.1 F  134 H  20  98/67  99


 


 08/13/21 01:14   133 H   


 


 08/13/21 00:17   122 H   94/59 


 


 08/12/21 23:25   131 H   98/65 


 


 08/12/21 23:23   133 H   91/59 


 


 08/12/21 23:19  98.0 F  133 H  18  96/65  100


 


 08/12/21 20:00  97.8 F  135 H  18  104/72  100


 


 08/12/21 16:00  98.0 F  123 H  18  116/77  98








                                Intake and Output











 08/13/21 08/13/21 08/13/21





 06:59 14:59 22:59


 


Intake Total  480 


 


Output Total 800 550 


 


Balance -800 -70 


 


Intake:   


 


  Oral  480 


 


Output:   


 


  Urine 800 550 


 


Other:   


 


  Voiding Method Indwelling Catheter  


 


  # Bowel Movements 1 1 


 


  Weight 49.5 kg 49.5 kg 











GENERAL DESCRIPTION:  Elderly  male lying in bed, no distress. No tachypnea or 

accessory muscle of respiration use.


HEENT: Shows Pallor , no scleral icterus. Oral mucous membrane is dry. No 

pharyngeal erythema or thrush


NECK: Trachea central, no thyromegaly.


LUNGS: Unlabored breathing. decreased breath sounds at base. No wheeze or 

crackle.


HEART: S1, S2, regular rate and rhythm. No loud murmur


ABDOMEN: Soft, no tenderness , guarding or rigidity, no organomegaly


EXTREMITIES: No edema of feet.


SKIN: No rash, no masses palpable.


NEUROLOGICAL: The patient is awake, alert, but non verbal , orientation couldnot

 be determined.

















Results


CBC & Chem 7: 


                                 08/13/21 07:44





                                 08/13/21 07:44


Labs: 


                  Abnormal Lab Results - Last 24 Hours (Table)











  08/12/21 08/12/21 08/12/21 Range/Units





  08:57 15:59 17:37 


 


RBC     (4.30-5.90)  m/uL


 


Hgb     (13.0-17.5)  gm/dL


 


Hct     (39.0-53.0)  %


 


MCHC     (31.0-37.0)  g/dL


 


RDW     (11.5-15.5)  %


 


Plt Count     (150-450)  k/uL


 


Chloride     ()  mmol/L


 


BUN     (9-20)  mg/dL


 


Glucose     (74-99)  mg/dL


 


POC Glucose (mg/dL)    205 H  (75-99)  mg/dL


 


Plasma Lactic Acid Braden   2.2 H*   (0.7-2.0)  mmol/L


 


Calcium     (8.4-10.2)  mg/dL


 


Total Protein     (6.3-8.2)  g/dL


 


Albumin     (3.5-5.0)  g/dL


 


Procalcitonin  0.19 H    (0.02-0.09)  ng/mL














  08/12/21 08/13/21 08/13/21 Range/Units





  20:18 06:11 07:44 


 


RBC    4.00 L  (4.30-5.90)  m/uL


 


Hgb    11.7 L  (13.0-17.5)  gm/dL


 


Hct    37.9 L  (39.0-53.0)  %


 


MCHC    30.9 L  (31.0-37.0)  g/dL


 


RDW    16.2 H  (11.5-15.5)  %


 


Plt Count    132 L  (150-450)  k/uL


 


Chloride     ()  mmol/L


 


BUN     (9-20)  mg/dL


 


Glucose     (74-99)  mg/dL


 


POC Glucose (mg/dL)  170 H  125 H   (75-99)  mg/dL


 


Plasma Lactic Acid Braden     (0.7-2.0)  mmol/L


 


Calcium     (8.4-10.2)  mg/dL


 


Total Protein     (6.3-8.2)  g/dL


 


Albumin     (3.5-5.0)  g/dL


 


Procalcitonin     (0.02-0.09)  ng/mL














  08/13/21 08/13/21 Range/Units





  07:44 11:42 


 


RBC    (4.30-5.90)  m/uL


 


Hgb    (13.0-17.5)  gm/dL


 


Hct    (39.0-53.0)  %


 


MCHC    (31.0-37.0)  g/dL


 


RDW    (11.5-15.5)  %


 


Plt Count    (150-450)  k/uL


 


Chloride  114 H   ()  mmol/L


 


BUN  36 H   (9-20)  mg/dL


 


Glucose  148 H   (74-99)  mg/dL


 


POC Glucose (mg/dL)   159 H  (75-99)  mg/dL


 


Plasma Lactic Acid Braden    (0.7-2.0)  mmol/L


 


Calcium  8.0 L   (8.4-10.2)  mg/dL


 


Total Protein  4.9 L   (6.3-8.2)  g/dL


 


Albumin  2.0 L   (3.5-5.0)  g/dL


 


Procalcitonin    (0.02-0.09)  ng/mL








                      Microbiology - Last 24 Hours (Table)











 08/12/21 01:56 Blood Culture Gram Stain - Preliminary





 Blood Blood Culture - Preliminary





    Staphylococcus epidermidis


 


 08/12/21 01:56 Blood Culture - Final





 Blood 














Assessment and Plan


Assessment: 


1-patient with a gram-positive bacteremia in this patient presented to hospital 

after he was noticed to be unresponsive at the nursing home there is no history 

of any fever no fever has been recorded here patient did have elevated white 

count which is subsequent normalized chest x-ray is mostly CHF pattern and the 

patient did have a negative UA abdominal soft clinical examination and no 

evidence of any wound or cellulitis concern for possible skin contamination





(1) Gram-positive cocci bacteremia


Current Visit: Yes   Status: Acute   Code(s): R78.81 - BACTEREMIA   SNOMED 

Code(s): 841047748645


   


Plan: 


1-blood cultures will be repeated document clearance of his bacteremia


2-we will check a CRP and procalcitonin level


3-vancomycin pharmacy to dose her with a target trough of 15 while watching her 

kidney function and Vanco trough closely.


We will follow on clinical condition and cultures to further adjust medication 

if needed


Thank you for this consultation we will follow the patient along with you





Time with Patient: Greater than 30

## 2021-08-13 NOTE — PN
PROGRESS NOTE



Mr. Saavedra is an 80-year-old male who was transferred from [facility] 
for

change in mental status and being unresponsive.  He is answering fairly few 
questions

at this time. On the monitor, he had what appears to be atrial tachycardia with 
2:1

conduction persistent and there was some episode that could reflect atrial 
flutter,

although it is unclear.  He had an echocardiogram of very poor quality and could
not

get any evaluation of his left ventricular systolic function.  His blood 
pressure has

been on the low side but continues to be tachycardic.  He continues to be on a 
beta

blocker, aspirin, Lipitor 20 mg daily, Jardiance. Apparently he had episodes of

recurrent GI bleeding and had to be admitted to the hospital with that.



PHYSICAL EXAMINATION:

Blood pressure is running in the high 90s with a heart rate in the 130s.

LUNGS: No wheezes.

HEART: Tachycardic. S1, S2.  No S3, with a systolic murmur.

ABDOMEN:  Soft, nontender.

EXTREMITIES: Trace edema.



LAB DATA:

Lab data revealed BUN and creatinine of 36 and 0.81.  Hemoglobin 11.7. Potassium
3.5.



IMPRESSION:

1. Change in mental status; etiology unclear.

2. Atrial tachycardia with 2:1 conduction. Remains tachycardic.

3. History of diabetes.

4. History of dementia.

5. Hyperlipidemia.

6. Elevation of troponin, most likely reflecting a type 2 cardiac event.

7. Prior history of gastrointestinal bleeding.



RECOMMENDATIONS:

From the cardiac standpoint, I will start him on IV amiodarone and see if we can
slow

his heart rate down.  Otherwise, unfortunately, because of the recent GI 
bleeding,

anticoagulation is not indicated.  Will continue conservative treatment. 
Depending on

his progress, further recommendations will be made.





MMODL / IJN: [id number] / Job#: [id number]

MTDD

## 2021-08-14 NOTE — P.PN
Subjective


Progress Note Date: 08/14/21


Principal diagnosis: 





Altered mental status, lethargy





This is an 80-year-old male patient who was brought in from [facility] 

with altered mental status.  Apparently had previously been a no code patient 

but was moved back to a full code per his wife.  He was brought in after being 

found unresponsive at the nursing home.  Unknown length of time from his altered

mental status.  He is unresponsive in the emergency room and unable to provide 

any history.  Chest x-ray revealed changes of congestive heart failure.  EKG 

reveals sinus tachycardia with previous inferior wall myocardial infarction.  

White count 15.2.  Hemoglobin 13.7.  Sodium 150.  Potassium 3.8.  Chloride 115. 

Bicarb 28.  BUN 39.  Creatinine 1.09.  Glucose 322.  He was initiated on 0.9 

normal saline at 1:30 ML's per hour.  Antibiotics in the form of ceftriaxone and

azithromycin.  He received Lasix 40 mg IVP 1.  He is seen today in consultation

on the selective care unit.  He is currently unresponsive.  No response to loud 

verbal stimuli.  No response to painful stimuli.  Stat ABGs were ordered and was

found to have a pO2 of 149, pCO2 43, pH 7.45 on 30% FiO2.  Stat CT scan of the 

brain revealed degenerative and nonspecific white matter changes most typical 

emote ischemia.  Greater central ventricular dilation, can be associated with 

normal pressure hydrocephalus.  Neuro consult pending.





The patient is seen today 08/13/2021 in follow-up on the selective care unit.  

He is a bit more awake and alert today.  Following some simple commands.  

Maintaining O2 saturations in the upper 90s on 3 L/m per nasal cannula.  He is 

afebrile.  Blood cultures positive for gram-positive cocci in groups.  White 

count 7.4.  Hemoglobin 11.7.  Sodium 144.  Potassium 3.5.  Creatinine 0.81.  

Glucose 148.  He is in atrial fibrillation with rapid ventricular response.  

Initiated on an amiodarone drip currently at 1 mg/m.  He remains on antibiotics 

in the form of ceftriaxone and azithromycin.





The patient is seen today 08/14/2021 in follow-up on the selective care unit.  

He is currently resting comfortably in bed.  He remains awake and alert.  No 

acute distress.  Maintaining O2 saturations up to 100% on 4 L/m per nasal 

cannula.  He is afebrile.  Still slightly tachycardic.  Remains on amiodarone 

drip at 0.5 mg/m.  Blood culture positive for Staphylococcus epidermidis.  

Currently on vancomycin.  Heparin subcu for DVT prophylaxis.  White count 5.6.  

Hemoglobin 10.3.  Platelets 109.  Sodium 139.  Potassium 3.3.  Creatinine 0.82.





Objective





- Vital Signs


Vital signs: 


                                   Vital Signs











Temp  97.7 F   08/14/21 08:00


 


Pulse  118 H  08/14/21 08:00


 


Resp  18   08/14/21 08:00


 


BP  113/69   08/14/21 08:00


 


Pulse Ox  100   08/14/21 08:00








                                 Intake & Output











 08/13/21 08/14/21 08/14/21





 18:59 06:59 18:59


 


Intake Total 720 580 


 


Output Total 550 200 


 


Balance 170 380 


 


Weight 49.5 kg  


 


Intake:   


 


  Oral 720 580 


 


Output:   


 


  Urine 550 200 


 


Other:   


 


  Voiding Method  Indwelling Catheter Indwelling Catheter


 


  # Bowel Movements 1  














- Exam





GENERAL EXAM: Awake, following some simple commands, 80-year-old male patient on

3 L nasal cannula.


HEAD: Normocephalic.


EYES: Normal reaction of pupils, equal size.


NOSE: Clear with pink turbinates.


THROAT: No erythema or exudates.


NECK: No masses, no JVD.


CHEST: No chest wall deformity.


LUNGS: Equal air entry with bilateral crackles in the posterior bases.


CVS: S1 and S2 normal with no audible murmur, regular rhythm.


ABDOMEN: No hepatosplenomegaly, normal bowel sounds, no guarding or rigidity.


SPINE: No scoliosis or deformity


SKIN: No rashes


CENTRAL NERVOUS SYSTEM: Dementia, tone is normal in all 4 extremities.


EXTREMITIES: There is no peripheral edema.  No clubbing, no cyanosis.  

Peripheral pulses are intact.





- Labs


CBC & Chem 7: 


                                 08/14/21 07:38





                                 08/14/21 07:38


Labs: 


                  Abnormal Lab Results - Last 24 Hours (Table)











  08/13/21 08/13/21 08/13/21 Range/Units





  12:45 16:45 19:53 


 


RBC     (4.30-5.90)  m/uL


 


Hgb     (13.0-17.5)  gm/dL


 


Hct     (39.0-53.0)  %


 


RDW     (11.5-15.5)  %


 


Plt Count     (150-450)  k/uL


 


Potassium     (3.5-5.1)  mmol/L


 


BUN     (9-20)  mg/dL


 


Glucose     (74-99)  mg/dL


 


POC Glucose (mg/dL)   140 H  140 H  (75-99)  mg/dL


 


Calcium     (8.4-10.2)  mg/dL


 


C-Reactive Protein     (<1.0)  mg/dL


 


Total Protein     (6.3-8.2)  g/dL


 


Albumin     (3.5-5.0)  g/dL


 


Vitamin B12  1876.0 H    (200.0-944.0)  pg/mL














  08/14/21 08/14/21 08/14/21 Range/Units





  06:34 07:38 07:38 


 


RBC   3.53 L   (4.30-5.90)  m/uL


 


Hgb   10.3 L   (13.0-17.5)  gm/dL


 


Hct   33.2 L   (39.0-53.0)  %


 


RDW   16.6 H   (11.5-15.5)  %


 


Plt Count   109 L   (150-450)  k/uL


 


Potassium    3.3 L  (3.5-5.1)  mmol/L


 


BUN    39 H  (9-20)  mg/dL


 


Glucose    146 H  (74-99)  mg/dL


 


POC Glucose (mg/dL)  181 H    (75-99)  mg/dL


 


Calcium    7.8 L  (8.4-10.2)  mg/dL


 


C-Reactive Protein    2.1 H  (<1.0)  mg/dL


 


Total Protein    4.5 L  (6.3-8.2)  g/dL


 


Albumin    1.9 L  (3.5-5.0)  g/dL


 


Vitamin B12     (200.0-944.0)  pg/mL














  08/14/21 Range/Units





  11:42 


 


RBC   (4.30-5.90)  m/uL


 


Hgb   (13.0-17.5)  gm/dL


 


Hct   (39.0-53.0)  %


 


RDW   (11.5-15.5)  %


 


Plt Count   (150-450)  k/uL


 


Potassium   (3.5-5.1)  mmol/L


 


BUN   (9-20)  mg/dL


 


Glucose   (74-99)  mg/dL


 


POC Glucose (mg/dL)  148 H  (75-99)  mg/dL


 


Calcium   (8.4-10.2)  mg/dL


 


C-Reactive Protein   (<1.0)  mg/dL


 


Total Protein   (6.3-8.2)  g/dL


 


Albumin   (3.5-5.0)  g/dL


 


Vitamin B12   (200.0-944.0)  pg/mL








                      Microbiology - Last 24 Hours (Table)











 08/12/21 01:56 Blood Culture Gram Stain - Preliminary





 Blood Blood Culture - Preliminary





    Staphylococcus epidermidis














Assessment and Plan


Assessment: 





1 Altered mental status of unclear etiology, unsure of baseline with history of 

dementia computed tomography scan of the brain reveals degenerative and 

nonspecific white matter changes most typically remote ischemia.  Greater 

central vent jugular dilation can be associated with normal pressure hydrocephal

us.  Neurology states toxic metabolic encephalopathy.





2 Acute hypoxemic respiratory failure secondary to congestive heart failure, 

echocardiogram pending





3 Bacteremia secondary to gram positive cocci in clusters





4 Hypernatremia, improved current sodium 139





5 Hyperkalemia, improved current potassium 3.3





6 Hyperglycemia, improved current glucose 146





7 Atrial fibrillation with a rapid ventricular response, currently on amiodarone

drip





8 Dementia





9 Nursing home resident





Plan:





The patient was seen and evaluated by Dr. Pastor


Preliminary blood culture with Staphylococcus epidermidis


Currently on vancomycin


Amiodarone drip for A. fib RVR


Prognosis is poor


DNR/DNI CODE STATUS


We will see as needed





I, the cosigning physician, performed a history & physical examination of the 

patient. Lungs sounds with crackles bilateral bases.  Maintaining good O2 

saturations in the 90s on 3 L/m per nasal cannula. I discussed the assessment 

and plan of care with my nurse practitioner, Suzan Dallas. I attest to the above 

note as dictated by her.

## 2021-08-14 NOTE — P.PN
Subjective





Patient is a 80-year-old the male came in because patient is barely responsive. 

When I valid the patient patient does live ache answering minimal the questions.

 Patient is a nursing home resident.  Patient was transferred to nursing home 

after his inpatient hospitalization at an outside facility.  I'm unable to get 

much history from wife either.  As per the wife patient was eating patient 

became much less responsive and was sent in to the hospital patient had a chest 

x-ray, which looks like heart failure because of which I ordered a BNP which is 

highly elevated to 23,000 patient will be given a dose of Lasix.  IV fluids at 

this time.  Patient also found to have elevated white blood glucose of 322.  

Patient is receiving normal saline at 1 36/h which will be discontinued at this 

time patient was later switched to D5 half-normal saline which will risk and 

urine as well.  Patient had a computed tomography scan of the head which 

revealed degenerative changes with nonspecific white matter changes.  Patient 

does haven't by dilatation because of age-related went up to dilatation and 

probably dementia.  Neurology will evaluate the patient as well





08/13/2021





Patient mental status remains the same.  Patient blood cultures came back pos

itive for gram-positive cocci in clusters can be contamination as a tone in came

back at 0.19 which indicates low chance for any bacterial infection.  Patient's 

creatinine did improve with IV Lasix.  Echocardiogram did not show any more IVC 

dilatation because of which the patient will not be given any Lasix at this 

time.  Patient is in A. fib atrial fibrillation for which patient was started on

amiodarone by cardiology.  Patient was evaluated by neurology, infectious 

disease is currently being consulted as well because of bacteremia, repeat blood

cultures will be obtain for today and tomorrow.  Patient blood sugars are bit 

better today as patient is not eating be probably need to back off on his 

diabetic regimen.  Patient will be just started on sliding scale insulin, 

Giardia and's will be held.





08/14/2021


Patient when the status improved quite a bit patient is more awake today patient

had an EEG which showed a encephalopathy most probably metabolic.  Patient 

awaiting diet now.  Patient's pro calcitonin is not consistent with infection 

patient blood cultures are positive for staph epidermidis which is a 

contamination probably can this can you vancomycin.  Patient main issue appears 

to be metabolic encephalopathy from possible CHF and hyperglycemia.  Patient 

remains on amiodarone drip.  Heart rate is still bit elevated.





Constitutional: Denied any fatigue denied any fever.


Cardio vascular: denied any chest pain, palpitations


Gastrointestinal denied any nausea vomiting


Pulmonary: Denied any shortness of breath cough


Neurologic denied any new focal deficits





All inpatient medications were reviewed and appropriate changes in these 

medications as dictated in the interval history and assessment and plan.








PHYSICAL EXAMINATION: 





GENERAL: Patient is bit arousable now and response now I believe he is answering

appropriately but unable to is his orientation, not in any acute distress.  Thin

built cachectic 


HEENT: Pupils are round and equally reacting to light. EOMI. No scleral icterus.

No conjunctival pallor. Normocephalic, atraumatic. No pharyngeal erythema. No 

thyromegaly. 


CARDIOVASCULAR: S1 and S2 present. No murmurs, rubs, or gallops. 


PULMONARY: Chest is clear to auscultation, no wheezing or crackles. 


ABDOMEN: Soft, nontender, nondistended, normoactive bowel sounds. No palpable 

organomegaly. 


MUSCULOSKELETAL: No joint swelling or deformity.


EXTREMITIES: No cyanosis, clubbing, or pedal edema. 


NEUROLOGICAL: Unable to assess mental status as mentioned above arousable and 

responsive


SKIN: No rashes. 





Assessment and plan


-Altered mental status, state of unresponsiveness: Patient appears to have 

severe metabolic encephalopathy, significant improvement since admission.  

Severe metabolic is a lot is probably related to heart failure and 

hyperglycemia.  Patient is now eating patient was started on sliding scale 

insulin.  Patient may need a long-acting insulin on his home dose of Jardiance. 

Patient appears to have contaminated blood sample and positive cultures with 

staph epidermidis.  May not need vancomycin.


-Acute hypoxic respiratory failure , patient is saturating well now probably 

related to congestive heart failure, unable to assess ejection fraction and 

echocardiogram patient is in atrial fibrillation for which patient is on 

amiodarone, patient doesn't have any dilated IVC, will not continue any more 

Lasix hypoxia appears to have improved since starting it  99% on 2 L patient's 

blood pressure is borderline.  May have had a diastolic dysfunction heart 

failure exacerbation from atrial fibrillation


-Paroxysmal A. fib: Presently rapid regular rate patient was started on 

amiodarone, patient is on anticoagulation.


-Mildly elevated troponin secondary to heart failure.


-Type 2 diabetes mellitus , patient blood sugars are well controlled to go up as

patient is tolerating diet at this time.  Started on sliding scale insulin


-Possible dementia from the wife's history patient appears to have advanced 

dementia


-Leukocytosis reactive so far no clear source of infection


DVT prophylaxis: Patient will be started on subcutaneous heparin


-Patient is DO NOT RESUSCITATE discussed with the wife.





Objective





- Vital Signs


Vital signs: 


                                   Vital Signs











Temp  97.7 F   08/14/21 08:00


 


Pulse  118 H  08/14/21 08:00


 


Resp  18   08/14/21 08:00


 


BP  113/69   08/14/21 08:00


 


Pulse Ox  100   08/14/21 08:00








                                 Intake & Output











 08/13/21 08/14/21 08/14/21





 18:59 06:59 18:59


 


Intake Total 720 580 


 


Output Total 550 200 


 


Balance 170 380 


 


Weight 49.5 kg  


 


Intake:   


 


  Oral 720 580 


 


Output:   


 


  Urine 550 200 


 


Other:   


 


  Voiding Method  Indwelling Catheter 


 


  # Bowel Movements 1  














- Labs


CBC & Chem 7: 


                                 08/14/21 07:38





                                 08/14/21 07:38


Labs: 


                  Abnormal Lab Results - Last 24 Hours (Table)











  08/13/21 08/13/21 08/13/21 Range/Units





  11:42 16:45 19:53 


 


RBC     (4.30-5.90)  m/uL


 


Hgb     (13.0-17.5)  gm/dL


 


Hct     (39.0-53.0)  %


 


RDW     (11.5-15.5)  %


 


Plt Count     (150-450)  k/uL


 


Potassium     (3.5-5.1)  mmol/L


 


BUN     (9-20)  mg/dL


 


Glucose     (74-99)  mg/dL


 


POC Glucose (mg/dL)  159 H  140 H  140 H  (75-99)  mg/dL


 


Calcium     (8.4-10.2)  mg/dL


 


Total Protein     (6.3-8.2)  g/dL


 


Albumin     (3.5-5.0)  g/dL














  08/14/21 08/14/21 08/14/21 Range/Units





  06:34 07:38 07:38 


 


RBC   3.53 L   (4.30-5.90)  m/uL


 


Hgb   10.3 L   (13.0-17.5)  gm/dL


 


Hct   33.2 L   (39.0-53.0)  %


 


RDW   16.6 H   (11.5-15.5)  %


 


Plt Count   109 L   (150-450)  k/uL


 


Potassium    3.3 L  (3.5-5.1)  mmol/L


 


BUN    39 H  (9-20)  mg/dL


 


Glucose    146 H  (74-99)  mg/dL


 


POC Glucose (mg/dL)  181 H    (75-99)  mg/dL


 


Calcium    7.8 L  (8.4-10.2)  mg/dL


 


Total Protein    4.5 L  (6.3-8.2)  g/dL


 


Albumin    1.9 L  (3.5-5.0)  g/dL








                      Microbiology - Last 24 Hours (Table)











 08/12/21 01:56 Blood Culture Gram Stain - Preliminary





 Blood Blood Culture - Preliminary





    Staphylococcus epidermidis

## 2021-08-14 NOTE — PN
PROGRESS NOTE



DATE OF SERVICE:

08/14/2021



REASON FOR FOLLOWUP:

Bacteremia.



INTERVAL HISTORY:

The patient is currently afebrile.  The patient seems to be more awake and alert. He is

breathing comfortably on oxygen.  Denies any chest pain or cough.  No abdominal pain or

diarrhea.



PHYSICAL EXAMINATION:

Blood pressure 113/64, pulse of 118, temperature 97.6.  He is 96% on room air.

GENERAL DESCRIPTION: General description is an elderly male lying in bed in no

distress.

RESPIRATORY SYSTEM: Unlabored breathing. Decreased breath sounds at the bases. No

wheeze.

HEART: S1, S2.  Regular rate and rhythm.

ABDOMEN:  Soft. No tenderness.



LABS:

Hemoglobin is 10.5, white count 5.6. BUN of 39, creatinine 0.82. Blood culture with

Staph epidermidis.



DIAGNOSTIC IMPRESSION AND PLAN:

Patient with positive blood culture with Staph epidermidis, likely skin contaminant.

Blood culture repeat has been negative so far.  Will discontinue vancomycin and monitor

the patient closely off antibiotic therapy.





MMODL / IJN: [id number] / Job#: [id number]

## 2021-08-14 NOTE — P.PN
Subjective





HISTORY OF PRESENTING ILLNESS


This is an 80-year-old gentleman who presented from [facility] with 

unresponsiveness.  It is unclear how long he's been unresponsive and it is 

unclear what his baseline mental status is.  He apparently up until recently was

a no code however the wife changed his status to a full code.  His history and 

HPI were obtained from the chart.  There is also no family available at this 

time although the wife is apparently on her way.  Reviewing the medications it 

appears patient has a history of dementia, hyperlipidemia, hypertension and 

diabetes.  Labs on admission showed a white blood cell count of 15,000, sodium 

150, BUN 39, creatinine 1.09, magnesium 2.4.  Plasma lactic acid level 2.9.  Her

consult for elevated troponins which came back at 0.132 and 0.131.  NT proBNP 

was also elevated at 23,000 above the patient does not appear to be in failure. 

There is no evidence of edema no evidence of orthopnea and no evidence of 

respiratory distress.  His oxygen saturation is  on 2 L via nasal cannula.

 EKG on admission was read as sinus tachycardia with short WV however upon 

review appears to be atrial tachycardia with 2 to one conduction.  Patient is 

currently in atrial flutter with 2-1 conduction on telemetry.  Heart rate is in 

the 120s.





8/14/2021


Patient seen and examined.  Patient lethargic however somewhat arousable and 

states he is not having any chest pain or pressure.  States he feels his 

breathing is okay.  Still remains fatigued and poor oral intake.








PHYSICAL EXAMINATION


Vital signs reviewed.


CONSTITUTIONAL: No apparent distress, chronically ill appearing, lethargic, 

frail


HEENT: Head is normocephalic. Pupils are equal, round. Sclerae anicteric. Mucous

membranes of the mouth are moist.  No JVD. No carotid bruit.


CHEST EXAMINATION: Decreased breath sounds bilateralls


HEART EXAMINATION: Regular rate and rhythm. S1, S2 heard. No murmurs, gallops or

rub.


ABDOMEN: Soft, nontender. Positive bowel sounds.


EXTREMITIES: no lower extremity edema and no calf tenderness.


NEUROLOGIC EXAMINATION: Patient is awake, somnolent 





ASSESSMENT


#1 decreased level of consciousness


#2 atrial flutter of unknown duration


#3 hypertension


#4 Dementia


#5 hyperlipidemia


#6 diabetes mellitus type 2


#7 elevated troponins not consistent with acute coronary event


#8 Cardiomyopathy with reported previously 25% per daughter, repeat 2D echo 

unable to assess however EF visually appears 25-40% range.





Plan: 


Patient with continued tachycardia, placed on amiodarone drip yesterday.  Prior 

echocardiogram unable to accurately assess EF however appears decreased.  Likely

tachycardia-induced cardiomyopathy.  Continue supportive care.  Monitor fluid 

balance however appears has not been taking much oral intake.  Continue 

antibiotics for bacteremia.  Continue supportive care.  Change amiodarone drip 

to oral amiodarone 400 mg twice a day for one week and monitor response.











Objective





- Vital Signs


Vital signs: 


                                   Vital Signs











Temp  97.6 F   08/14/21 15:06


 


Pulse  118 H  08/14/21 15:06


 


Resp  18   08/14/21 15:06


 


BP  113/64   08/14/21 15:06


 


Pulse Ox  96   08/14/21 15:06








                                 Intake & Output











 08/13/21 08/14/21 08/14/21





 18:59 06:59 18:59


 


Intake Total 720 580 


 


Output Total 550 200 


 


Balance 170 380 


 


Weight 49.5 kg  


 


Intake:   


 


  Oral 720 580 


 


Output:   


 


  Urine 550 200 


 


Other:   


 


  Voiding Method  Indwelling Catheter Indwelling Catheter


 


  # Bowel Movements 1  














- Labs


CBC & Chem 7: 


                                 08/14/21 07:38





                                 08/14/21 07:38


Labs: 


                  Abnormal Lab Results - Last 24 Hours (Table)











  08/13/21 08/13/21 08/14/21 Range/Units





  12:45 19:53 06:34 


 


RBC     (4.30-5.90)  m/uL


 


Hgb     (13.0-17.5)  gm/dL


 


Hct     (39.0-53.0)  %


 


RDW     (11.5-15.5)  %


 


Plt Count     (150-450)  k/uL


 


Potassium     (3.5-5.1)  mmol/L


 


BUN     (9-20)  mg/dL


 


Glucose     (74-99)  mg/dL


 


POC Glucose (mg/dL)   140 H  181 H  (75-99)  mg/dL


 


Calcium     (8.4-10.2)  mg/dL


 


C-Reactive Protein     (<1.0)  mg/dL


 


Total Protein     (6.3-8.2)  g/dL


 


Albumin     (3.5-5.0)  g/dL


 


Vitamin B12  1876.0 H    (200.0-944.0)  pg/mL














  08/14/21 08/14/21 08/14/21 Range/Units





  07:38 07:38 11:42 


 


RBC  3.53 L    (4.30-5.90)  m/uL


 


Hgb  10.3 L    (13.0-17.5)  gm/dL


 


Hct  33.2 L    (39.0-53.0)  %


 


RDW  16.6 H    (11.5-15.5)  %


 


Plt Count  109 L    (150-450)  k/uL


 


Potassium   3.3 L   (3.5-5.1)  mmol/L


 


BUN   39 H   (9-20)  mg/dL


 


Glucose   146 H   (74-99)  mg/dL


 


POC Glucose (mg/dL)    148 H  (75-99)  mg/dL


 


Calcium   7.8 L   (8.4-10.2)  mg/dL


 


C-Reactive Protein   2.1 H   (<1.0)  mg/dL


 


Total Protein   4.5 L   (6.3-8.2)  g/dL


 


Albumin   1.9 L   (3.5-5.0)  g/dL


 


Vitamin B12     (200.0-944.0)  pg/mL














  08/14/21 Range/Units





  16:32 


 


RBC   (4.30-5.90)  m/uL


 


Hgb   (13.0-17.5)  gm/dL


 


Hct   (39.0-53.0)  %


 


RDW   (11.5-15.5)  %


 


Plt Count   (150-450)  k/uL


 


Potassium   (3.5-5.1)  mmol/L


 


BUN   (9-20)  mg/dL


 


Glucose   (74-99)  mg/dL


 


POC Glucose (mg/dL)  141 H  (75-99)  mg/dL


 


Calcium   (8.4-10.2)  mg/dL


 


C-Reactive Protein   (<1.0)  mg/dL


 


Total Protein   (6.3-8.2)  g/dL


 


Albumin   (3.5-5.0)  g/dL


 


Vitamin B12   (200.0-944.0)  pg/mL








                      Microbiology - Last 24 Hours (Table)











 08/13/21 12:45 Blood Culture - Preliminary





 Blood    No Growth after 24 hours


 


 08/12/21 01:56 Blood Culture Gram Stain - Preliminary





 Blood Blood Culture - Preliminary





    Staphylococcus epidermidis

## 2021-08-14 NOTE — XR
EXAMINATION TYPE: XR chest 2V

 

DATE OF EXAM: 8/14/2021

 

COMPARISON: 8/12/2021

 

HISTORY: 80-year-old male pneumonia

 

TECHNIQUE:  AP and lateral views

 

FINDINGS:  

Heart borderline enlarged. Mild hyperinflation. Slight increasing moderate left and small right pleur
al effusions. Continued left hilar opacity. Aeration in the right lung shows some improvement.

 

 

IMPRESSION:  

Improving aeration in the right lung but with persistent left perihilar opacity and increasing modera
te left effusion. Small right effusion also noted on the lateral view. Correlate to exclude superimpo
sed CHF.

## 2021-08-14 NOTE — XR
EXAMINATION TYPE: XR elbow complete RT

 

DATE OF EXAM: 8/14/2021

 

COMPARISON: None

 

HISTORY: 80-year-old male right elbow pain and swelling

 

TECHNIQUE: 3 views

 

FINDINGS:  

Generalized soft tissue swelling with osteopenia. No underlying elbow joint effusion seen. Mild degen
erative spurring radiocapitellar and ulnotrochlear joints. An IV is present medially. No acute fractu
re, subluxation, dislocation.

 

 

IMPRESSION:  

Generalized soft tissue swelling. No underlying elbow joint effusion. No acute osseous abnormality se
en.

## 2021-08-14 NOTE — P.PN
Subjective





Patient is seen in follow-up for hypernatremia which has resolved.  He is 

currently being fed.  Remains on amiodarone drip for A. fib.  Nonoliguric.





Vital signs are stable.


General: The patient appeared well nourished and normally developed. 


HEENT: Head exam is unremarkable. 


LUNGS: Breath sounds decreased.


HEART: Irregular rate and rhythm.


ABDOMEN: Soft, no distention.


EXTREMITITES: No edema.





Objective





- Vital Signs


Vital signs: 


                                   Vital Signs











Temp  97.7 F   08/14/21 08:00


 


Pulse  118 H  08/14/21 08:00


 


Resp  18   08/14/21 08:00


 


BP  113/69   08/14/21 08:00


 


Pulse Ox  100   08/14/21 08:00








                                 Intake & Output











 08/13/21 08/14/21 08/14/21





 18:59 06:59 18:59


 


Intake Total 720 580 


 


Output Total 550 200 


 


Balance 170 380 


 


Weight 49.5 kg  


 


Intake:   


 


  Oral 720 580 


 


Output:   


 


  Urine 550 200 


 


Other:   


 


  Voiding Method  Indwelling Catheter 


 


  # Bowel Movements 1  














- Labs


CBC & Chem 7: 


                                 08/14/21 07:38





                                 08/14/21 07:38


Labs: 


                  Abnormal Lab Results - Last 24 Hours (Table)











  08/13/21 08/13/21 08/13/21 Range/Units





  11:42 16:45 19:53 


 


RBC     (4.30-5.90)  m/uL


 


Hgb     (13.0-17.5)  gm/dL


 


Hct     (39.0-53.0)  %


 


RDW     (11.5-15.5)  %


 


Plt Count     (150-450)  k/uL


 


Potassium     (3.5-5.1)  mmol/L


 


BUN     (9-20)  mg/dL


 


Glucose     (74-99)  mg/dL


 


POC Glucose (mg/dL)  159 H  140 H  140 H  (75-99)  mg/dL


 


Calcium     (8.4-10.2)  mg/dL


 


Total Protein     (6.3-8.2)  g/dL


 


Albumin     (3.5-5.0)  g/dL














  08/14/21 08/14/21 08/14/21 Range/Units





  06:34 07:38 07:38 


 


RBC   3.53 L   (4.30-5.90)  m/uL


 


Hgb   10.3 L   (13.0-17.5)  gm/dL


 


Hct   33.2 L   (39.0-53.0)  %


 


RDW   16.6 H   (11.5-15.5)  %


 


Plt Count   109 L   (150-450)  k/uL


 


Potassium    3.3 L  (3.5-5.1)  mmol/L


 


BUN    39 H  (9-20)  mg/dL


 


Glucose    146 H  (74-99)  mg/dL


 


POC Glucose (mg/dL)  181 H    (75-99)  mg/dL


 


Calcium    7.8 L  (8.4-10.2)  mg/dL


 


Total Protein    4.5 L  (6.3-8.2)  g/dL


 


Albumin    1.9 L  (3.5-5.0)  g/dL








                      Microbiology - Last 24 Hours (Table)











 08/12/21 01:56 Blood Culture Gram Stain - Preliminary





 Blood Blood Culture - Preliminary





    Staphylococcus epidermidis














Assessment and Plan


Plan: 





Assessment: 


1.  Hypernatremia from lack of oral water intake.  Improved.


2.  Hypokalemia from poor intake.  


3.  A. fib with RVR maintained on amiodarone drip.





Plan:


Encourage oral intake, including free water.


Replace potassium.

## 2021-08-15 NOTE — P.PN
Subjective





Patient is a 80-year-old the male came in because patient is barely responsive. 

When I valid the patient patient does live ache answering minimal the questions.

 Patient is a nursing home resident.  Patient was transferred to nursing home 

after his inpatient hospitalization at an outside facility.  I'm unable to get 

much history from wife either.  As per the wife patient was eating patient 

became much less responsive and was sent in to the hospital patient had a chest 

x-ray, which looks like heart failure because of which I ordered a BNP which is 

highly elevated to 23,000 patient will be given a dose of Lasix.  IV fluids at 

this time.  Patient also found to have elevated white blood glucose of 322.  

Patient is receiving normal saline at 1 36/h which will be discontinued at this 

time patient was later switched to D5 half-normal saline which will risk and 

urine as well.  Patient had a computed tomography scan of the head which 

revealed degenerative changes with nonspecific white matter changes.  Patient 

does haven't by dilatation because of age-related went up to dilatation and 

probably dementia.  Neurology will evaluate the patient as well





08/13/2021





Patient mental status remains the same.  Patient blood cultures came back pos

itive for gram-positive cocci in clusters can be contamination as a tone in came

back at 0.19 which indicates low chance for any bacterial infection.  Patient's 

creatinine did improve with IV Lasix.  Echocardiogram did not show any more IVC 

dilatation because of which the patient will not be given any Lasix at this 

time.  Patient is in A. fib atrial fibrillation for which patient was started on

amiodarone by cardiology.  Patient was evaluated by neurology, infectious 

disease is currently being consulted as well because of bacteremia, repeat blood

cultures will be obtain for today and tomorrow.  Patient blood sugars are bit 

better today as patient is not eating be probably need to back off on his 

diabetic regimen.  Patient will be just started on sliding scale insulin, 

Giardia and's will be held.





08/14/2021


Patient when the status improved quite a bit patient is more awake today patient

had an EEG which showed a encephalopathy most probably metabolic.  Patient 

awaiting diet now.  Patient's pro calcitonin is not consistent with infection 

patient blood cultures are positive for staph epidermidis which is a 

contamination probably can this can you vancomycin.  Patient main issue appears 

to be metabolic encephalopathy from possible CHF and hyperglycemia.  Patient 

remains on amiodarone drip.  Heart rate is still bit elevated.





08/15/2021


08/15/2021


Patient's blood sugars trended up as patient started eating.  Patient will be 

started on long-acting insulin 12 units.  Patient's vancomycin was discontinued 

no evidence of infection patient's pro calcitonin is around 0.19.  Physical t

herapy and occupational therapy will evaluate the patient patient to probably 

can be discharged in a day or 2.  Patient's blood pressure is on the low side IV

metoprolol will be discontinued will continue to hold metoprolol and amiodarone





Constitutional: Denied any fatigue denied any fever.


Cardio vascular: denied any chest pain, palpitations


Gastrointestinal denied any nausea vomiting


Pulmonary: Denied any shortness of breath cough


Neurologic denied any new focal deficits





All inpatient medications were reviewed and appropriate changes in these 

medications as dictated in the interval history and assessment and plan.








PHYSICAL EXAMINATION: 





GENERAL: Patient is bit arousable now and response now I believe he is answering

appropriately but unable to is his orientation, not in any acute distress.  Thin

built cachectic 


HEENT: Pupils are round and equally reacting to light. EOMI. No scleral icterus.

No conjunctival pallor. Normocephalic, atraumatic. No pharyngeal erythema. No 

thyromegaly. 


CARDIOVASCULAR: S1 and S2 present. No murmurs, rubs, or gallops. 


PULMONARY: Chest is clear to auscultation, no wheezing or crackles. 


ABDOMEN: Soft, nontender, nondistended, normoactive bowel sounds. No palpable 

organomegaly. 


MUSCULOSKELETAL: No joint swelling or deformity.


EXTREMITIES: No cyanosis, clubbing, or pedal edema. 


NEUROLOGICAL: Unable to assess mental status as mentioned above arousable and 

responsive


SKIN: No rashes. 





Assessment and plan


-Altered mental status, state of unresponsiveness: Patient appears to have 

severe metabolic encephalopathy, significant improvement since admission.  

Severe metabolic is  is probably related to heart failure and hyperglycemia.  

Patient is now eating patient was started on sliding scale insulin, adding long-

acting insulin.   Patient appears to have contaminated blood sample and positive

cultures with staph epidermidis.  Mycin was discontinued


-Acute hypoxic respiratory failure , patient is saturating well now probably 

related to congestive heart failure, unable to assess ejection fraction and 

echocardiogram patient is in atrial fibrillation for which patient is on 

amiodarone, patient doesn't have any dilated IVC, will not continue any more 

Lasix hypoxia appears to have improved since starting it  99% on 2 L patient's 

blood pressure is borderline.  May have had a diastolic dysfunction heart 

failure exacerbation from atrial fibrillation


-Paroxysmal A. fib: Presently rapid regular rate patient was started on 

amiodarone, patient is on anticoagulation.


-Mildly elevated troponin secondary to heart failure.


-Type 2 diabetes mellitus , patient blood sugars are well controlled to go up as

patient is tolerating diet at this time.  Started on sliding scale insulin


-Possible dementia from the wife's history patient appears to have advanced 

dementia


-Leukocytosis reactive so far no clear source of infection


DVT prophylaxis: Patient will be started on subcutaneous heparin


-Patient is DO NOT RESUSCITATE discussed with the wife.





Objective





- Vital Signs


Vital signs: 


                                   Vital Signs











Temp  97.4 F L  08/15/21 08:00


 


Pulse  84   08/15/21 08:00


 


Resp  16   08/15/21 08:00


 


BP  96/55   08/15/21 08:00


 


Pulse Ox  99   08/15/21 03:51








                                 Intake & Output











 08/14/21 08/15/21 08/15/21





 18:59 06:59 18:59


 


Intake Total  250 


 


Output Total  400 


 


Balance  -150 


 


Intake:   


 


  Oral  250 


 


Output:   


 


  Urine  400 


 


Other:   


 


  Voiding Method Indwelling Catheter Indwelling Catheter Indwelling Catheter














- Labs


CBC & Chem 7: 


                                 08/14/21 07:38





                                 08/15/21 07:06


Labs: 


                  Abnormal Lab Results - Last 24 Hours (Table)











  08/13/21 08/14/21 08/14/21 Range/Units





  12:45 07:38 07:38 


 


BUN     (9-20)  mg/dL


 


Glucose     (74-99)  mg/dL


 


POC Glucose (mg/dL)     (75-99)  mg/dL


 


Calcium     (8.4-10.2)  mg/dL


 


C-Reactive Protein   2.1 H   (<1.0)  mg/dL


 


Vitamin B12  1876.0 H    (200.0-944.0)  pg/mL


 


Procalcitonin    0.13 H  (0.02-0.09)  ng/mL














  08/14/21 08/14/21 08/14/21 Range/Units





  11:42 16:32 20:04 


 


BUN     (9-20)  mg/dL


 


Glucose     (74-99)  mg/dL


 


POC Glucose (mg/dL)  148 H  141 H  201 H  (75-99)  mg/dL


 


Calcium     (8.4-10.2)  mg/dL


 


C-Reactive Protein     (<1.0)  mg/dL


 


Vitamin B12     (200.0-944.0)  pg/mL


 


Procalcitonin     (0.02-0.09)  ng/mL














  08/15/21 08/15/21 Range/Units





  06:14 07:06 


 


BUN   43 H  (9-20)  mg/dL


 


Glucose   226 H  (74-99)  mg/dL


 


POC Glucose (mg/dL)  230 H   (75-99)  mg/dL


 


Calcium   8.1 L  (8.4-10.2)  mg/dL


 


C-Reactive Protein    (<1.0)  mg/dL


 


Vitamin B12    (200.0-944.0)  pg/mL


 


Procalcitonin    (0.02-0.09)  ng/mL








                      Microbiology - Last 24 Hours (Table)











 08/12/21 01:56 Blood Culture Gram Stain - Final





 Blood Blood Culture - Final





    Staphylococcus epidermidis


 


 08/13/21 12:45 Blood Culture - Preliminary





 Blood    No Growth after 24 hours

## 2021-08-15 NOTE — PN
PROGRESS NOTE



DATE OF SERVICE:

08/15/2021



REASON FOR FOLLOWUP:

Positive blood cultures.



INTERVAL HISTORY:

The patient is afebrile.  The patient is slightly sleepy, lethargic today per the

daughter at the bedside and not as talkative compared to yesterday.  No vomiting,

diarrhea or other changes reported by the nursing staff.



PHYSICAL EXAMINATION:

Blood pressure is 108/62 with a pulse of 73, temperature 97.4.  He is 98% on 3 L nasal

cannula.

GENERAL DESCRIPTION: General description is an elderly male lying in bed in no

distress.

RESPIRATORY SYSTEM: Unlabored breathing. Clear to auscultation anteriorly.

HEART: S1, S2.  Regular rate and rhythm.

ABDOMEN:  Soft. No tenderness.



LABS:

No new labs have been obtained today.  Blood cultures repeat on 8/14 and 8/13 have been

negative so far.



DIAGNOSTIC IMPRESSION AND PLAN:

1. Patient with positive blood culture with Staph epidermidis, more likely

    contaminant.  Repeat culture has been negative.

2. Patient now with weakness with concern for possible left perihilar infiltrate.  The

    patient does have AMOXICILLIN ALLERGY and the patient is on amiodarone, limiting

    the antibiotic coverage.  We will add Rocephin and see clinical response and repeat

    his inflammatory markers.





MMODL / IJN: [id number] / Job#: [id number]

## 2021-08-15 NOTE — P.PN
Subjective





Patient is seen in follow-up for hypernatremia which has resolved.  Oral intake 

fair but has to be fed.  Now on oral amiodarone.  Nonoliguric.





Vital signs are stable.


General: The patient appeared well nourished and normally developed. 


HEENT: Head exam is unremarkable. 


LUNGS: Breath sounds decreased.


HEART: Irregular rate and rhythm.


ABDOMEN: Soft, no distention.


EXTREMITITES: No edema.





Objective





- Vital Signs


Vital signs: 


                                   Vital Signs











Temp  97.4 F L  08/15/21 08:00


 


Pulse  84   08/15/21 08:00


 


Resp  16   08/15/21 08:00


 


BP  96/55   08/15/21 08:00


 


Pulse Ox  99   08/15/21 03:51








                                 Intake & Output











 08/14/21 08/15/21 08/15/21





 18:59 06:59 18:59


 


Intake Total  250 


 


Output Total  400 


 


Balance  -150 


 


Intake:   


 


  Oral  250 


 


Output:   


 


  Urine  400 


 


Other:   


 


  Voiding Method Indwelling Catheter Indwelling Catheter Indwelling Catheter














- Labs


CBC & Chem 7: 


                                 08/14/21 07:38





                                 08/15/21 07:06


Labs: 


                  Abnormal Lab Results - Last 24 Hours (Table)











  08/13/21 08/14/21 08/14/21 Range/Units





  12:45 07:38 07:38 


 


BUN     (9-20)  mg/dL


 


Glucose     (74-99)  mg/dL


 


POC Glucose (mg/dL)     (75-99)  mg/dL


 


Calcium     (8.4-10.2)  mg/dL


 


C-Reactive Protein   2.1 H   (<1.0)  mg/dL


 


Vitamin B12  1876.0 H    (200.0-944.0)  pg/mL


 


Procalcitonin    0.13 H  (0.02-0.09)  ng/mL














  08/14/21 08/14/21 08/14/21 Range/Units





  11:42 16:32 20:04 


 


BUN     (9-20)  mg/dL


 


Glucose     (74-99)  mg/dL


 


POC Glucose (mg/dL)  148 H  141 H  201 H  (75-99)  mg/dL


 


Calcium     (8.4-10.2)  mg/dL


 


C-Reactive Protein     (<1.0)  mg/dL


 


Vitamin B12     (200.0-944.0)  pg/mL


 


Procalcitonin     (0.02-0.09)  ng/mL














  08/15/21 08/15/21 Range/Units





  06:14 07:06 


 


BUN   43 H  (9-20)  mg/dL


 


Glucose   226 H  (74-99)  mg/dL


 


POC Glucose (mg/dL)  230 H   (75-99)  mg/dL


 


Calcium   8.1 L  (8.4-10.2)  mg/dL


 


C-Reactive Protein    (<1.0)  mg/dL


 


Vitamin B12    (200.0-944.0)  pg/mL


 


Procalcitonin    (0.02-0.09)  ng/mL








                      Microbiology - Last 24 Hours (Table)











 08/12/21 01:56 Blood Culture Gram Stain - Final





 Blood Blood Culture - Final





    Staphylococcus epidermidis


 


 08/13/21 12:45 Blood Culture - Preliminary





 Blood    No Growth after 24 hours














Assessment and Plan


Plan: 





Assessment: 


1.  Hypernatremia from lack of oral water intake.  Improved.


2.  Hypokalemia from poor intake.  Replace.  Better.


3.  A. fib with RVR maintained on oral amiodarone, Lopressor.





Plan:


Encourage oral intake, including free water.


No changes from nephrology standpoint.

## 2021-08-15 NOTE — P.PN
Subjective


Progress Note Date: 08/14/21 08/14/2021:  This is a tele-neurology follow-up performed today on 08/14/2021.





Patient is laying comfortably in the bed.  Offers no complaints.  States he 

feels "fine".  Still quite disoriented.  Patient is DO NOT RESUSCITATE.  Patient

is slightly more awake than other day.





Objective





- Vital Signs


Vital signs: 


                                   Vital Signs











Temp  97.4 F L  08/15/21 08:00


 


Pulse  84   08/15/21 08:00


 


Resp  16   08/15/21 08:00


 


BP  96/55   08/15/21 08:00


 


Pulse Ox  99   08/15/21 03:51








                                 Intake & Output











 08/14/21 08/15/21 08/15/21





 18:59 06:59 18:59


 


Intake Total  250 


 


Output Total  400 


 


Balance  -150 


 


Intake:   


 


  Oral  250 


 


Output:   


 


  Urine  400 


 


Other:   


 


  Voiding Method Indwelling Catheter Indwelling Catheter Indwelling Catheter














- Exam





Patient knows his name.  He states he is involved [place], could not tell the 

state.  He could not tell the month or year.  Patient knows his date of birth 

1940, but could not tell his age.  His speech is clear.  Limited language 

functions appears intact.  Patient did not cooperate with detailed testing.  

Face is symmetric.  Tongue protrudes to the midline.  Muscle strength appears 

equal.  Patient moves arms and legs equally.





- Labs


CBC & Chem 7: 


                                 08/14/21 07:38





                                 08/15/21 07:06


Labs: 


                  Abnormal Lab Results - Last 24 Hours (Table)











  08/13/21 08/14/21 08/14/21 Range/Units





  12:45 07:38 07:38 


 


BUN     (9-20)  mg/dL


 


Glucose     (74-99)  mg/dL


 


POC Glucose (mg/dL)     (75-99)  mg/dL


 


Calcium     (8.4-10.2)  mg/dL


 


C-Reactive Protein   2.1 H   (<1.0)  mg/dL


 


Vitamin B12  1876.0 H    (200.0-944.0)  pg/mL


 


Procalcitonin    0.13 H  (0.02-0.09)  ng/mL














  08/14/21 08/14/21 08/14/21 Range/Units





  11:42 16:32 20:04 


 


BUN     (9-20)  mg/dL


 


Glucose     (74-99)  mg/dL


 


POC Glucose (mg/dL)  148 H  141 H  201 H  (75-99)  mg/dL


 


Calcium     (8.4-10.2)  mg/dL


 


C-Reactive Protein     (<1.0)  mg/dL


 


Vitamin B12     (200.0-944.0)  pg/mL


 


Procalcitonin     (0.02-0.09)  ng/mL














  08/15/21 08/15/21 Range/Units





  06:14 07:06 


 


BUN   43 H  (9-20)  mg/dL


 


Glucose   226 H  (74-99)  mg/dL


 


POC Glucose (mg/dL)  230 H   (75-99)  mg/dL


 


Calcium   8.1 L  (8.4-10.2)  mg/dL


 


C-Reactive Protein    (<1.0)  mg/dL


 


Vitamin B12    (200.0-944.0)  pg/mL


 


Procalcitonin    (0.02-0.09)  ng/mL








                      Microbiology - Last 24 Hours (Table)











 08/12/21 01:56 Blood Culture Gram Stain - Final





 Blood Blood Culture - Final





    Staphylococcus epidermidis


 


 08/13/21 12:45 Blood Culture - Preliminary





 Blood    No Growth after 24 hours














Assessment and Plan


Assessment: 





* Altered mental status, likely due to toxic metabolic encephalopathy.


* Advanced dementia.


* Possible volume overload, CHF, possible pneumonia.


* Positive blood culture with staph epidermidis, felt to be skin contaminant.  

  Patient is off antibiotics now.  ID following.


* Diabetes, not well controlled.


* Mildly elevated troponin.


* Known history of atrial fibrillation, currently not on anticoagulation.


Plan: 





* Patient has advanced dementia.  Continue Aricept 10 mg and Namenda 10 mg twice

  a day.


* Patient's has atrial fibrillation, currently only on aspirin 81 mg.  Also on 

  Lipitor 20 mg.  Patient not a candidate for anticoagulation because of history

  of GI bleed, as per cardiology. 


* Treatment of various medical conditions as per IM.


* Patient currently on azithromycin and Rocephin.  Pulmonary also following.


* Echocardiogram showed resting tachycardia.  Repeat echo again been tachycardia

  improves it at great EF assessment desired.  This is a technically difficult 

  study with suboptimal views.  Aortic valve is trileaflet and mildly thickened.

   Mitral valve is mildly thickened.


* B12 1876, folate 13.2.  TSH is normal 1.67.


* EEG was abnormal due to disorganized background and mild-to-moderate 

  background slowing.  This suggestive of generalized cerebral dysfunction as 

  can be seen with toxic metabolic encephalopathy or due to diffuse structural 

  brain abnormality.  No epileptiform activity was seen.


* Treatment of various medical conditions as per IM and other specialties.  No 

  other neurological workup indicated.  We will sign off.  Please reconsult 

  neurology if any other concerns.

## 2021-08-15 NOTE — P.PN
Subjective





HISTORY OF PRESENTING ILLNESS


This is an 80-year-old gentleman who presented from [facility] with 

unresponsiveness.  It is unclear how long he's been unresponsive and it is 

unclear what his baseline mental status is.  He apparently up until recently was

a no code however the wife changed his status to a full code.  His history and 

HPI were obtained from the chart.  There is also no family available at this 

time although the wife is apparently on her way.  Reviewing the medications it 

appears patient has a history of dementia, hyperlipidemia, hypertension and 

diabetes.  Labs on admission showed a white blood cell count of 15,000, sodium 

150, BUN 39, creatinine 1.09, magnesium 2.4.  Plasma lactic acid level 2.9.  Her

consult for elevated troponins which came back at 0.132 and 0.131.  NT proBNP 

was also elevated at 23,000 above the patient does not appear to be in failure. 

There is no evidence of edema no evidence of orthopnea and no evidence of 

respiratory distress.  His oxygen saturation is  on 2 L via nasal cannula.

 EKG on admission was read as sinus tachycardia with short UT however upon 

review appears to be atrial tachycardia with 2 to one conduction.  Patient is 

currently in atrial flutter with 2-1 conduction on telemetry.  Heart rate is in 

the 120s.





8/14/2021


Patient seen and examined.  Patient lethargic however somewhat arousable and 

states he is not having any chest pain or pressure.  States he feels his 

breathing is okay.  Still remains fatigued and poor oral intake.





8/15


Seen and examined.  Patient somewhat more alert today.  Patient with daughter 

and is eating lunch.  Denies any chest pain or pressure.  No shortness breath.








PHYSICAL EXAMINATION


Vital signs reviewed.


CONSTITUTIONAL: No apparent distress, chronically ill appearing, lethargic, 

frail


HEENT: Head is normocephalic. Pupils are equal, round. Sclerae anicteric. Mucous

membranes of the mouth are moist.  No JVD. No carotid bruit.


CHEST EXAMINATION: Decreased breath sounds bilateralls


HEART EXAMINATION: Regular rate and rhythm. S1, S2 heard. No murmurs, gallops or

rub.


ABDOMEN: Soft, nontender. Positive bowel sounds.


EXTREMITIES: no lower extremity edema and no calf tenderness.


NEUROLOGIC EXAMINATION: Patient is awake, somnolent 





ASSESSMENT


#1 decreased level of consciousness


#2 atrial flutter of unknown duration


#3 hypertension


#4 Dementia


#5 hyperlipidemia


#6 diabetes mellitus type 2


#7 elevated troponins not consistent with acute coronary event


#8 Cardiomyopathy with reported previously 25% per daughter, repeat 2D echo 

unable to assess however EF visually appears 25-40% range.





Plan: 


Prior echocardiogram unable to accurately assess EF however appears decreased.  

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  


Continue supportive care.  


Monitor fluid balance however appears has not been taking much oral intake.  


Continue antibiotics for bacteremia.   


Continue amiodarone drip to oral amiodarone 400 mg twice a day for one week and 

monitor response.


Further recommendations to follow








Objective





- Vital Signs


Vital signs: 


                                   Vital Signs











Temp  97.4 F L  08/15/21 08:00


 


Pulse  84   08/15/21 08:00


 


Resp  16   08/15/21 08:00


 


BP  96/55   08/15/21 08:00


 


Pulse Ox  99   08/15/21 03:51








                                 Intake & Output











 08/14/21 08/15/21 08/15/21





 18:59 06:59 18:59


 


Intake Total  250 


 


Output Total  400 


 


Balance  -150 


 


Intake:   


 


  Oral  250 


 


Output:   


 


  Urine  400 


 


Other:   


 


  Voiding Method Indwelling Catheter Indwelling Catheter Indwelling Catheter














- Labs


CBC & Chem 7: 


                                 08/14/21 07:38





                                 08/15/21 07:06


Labs: 


                  Abnormal Lab Results - Last 24 Hours (Table)











  08/14/21 08/14/21 08/14/21 Range/Units





  07:38 16:32 20:04 


 


BUN     (9-20)  mg/dL


 


Glucose     (74-99)  mg/dL


 


POC Glucose (mg/dL)   141 H  201 H  (75-99)  mg/dL


 


Calcium     (8.4-10.2)  mg/dL


 


Procalcitonin  0.13 H    (0.02-0.09)  ng/mL














  08/15/21 08/15/21 08/15/21 Range/Units





  06:14 07:06 11:45 


 


BUN   43 H   (9-20)  mg/dL


 


Glucose   226 H   (74-99)  mg/dL


 


POC Glucose (mg/dL)  230 H   151 H  (75-99)  mg/dL


 


Calcium   8.1 L   (8.4-10.2)  mg/dL


 


Procalcitonin     (0.02-0.09)  ng/mL








                      Microbiology - Last 24 Hours (Table)











 08/14/21 07:38 Blood Culture - Preliminary





 Blood    No Growth after 24 hours


 


 08/12/21 01:56 Blood Culture Gram Stain - Final





 Blood Blood Culture - Final





    Staphylococcus epidermidis


 


 08/13/21 12:45 Blood Culture - Preliminary





 Blood    No Growth after 24 hours

## 2021-08-16 NOTE — CDI
Documentation Clarification Form



Date: 08/16/2021 08:47:25 AM

From: Margaret Torres RN CCDS 

Phone: [phone]

MRN: [id number]

Admit Date: 08/13/2021 10:59:00 AM

Patient Name: Janes Saavedra

Visit Number: [id number]

Discharge Date:  





ATTENTION: The Clinical Documentation Specialists (CDI) and [facility] Coding Staff 
appreciate your assistance in clarifying documentation. Please respond to the 
clarification below the line at the bottom and electronically sign. The CDI & 
[facility] Coding staff will review the response and follow-up if needed. Please note: 
Queries are made part of the Legal Health Record. If you have any questions, 
please contact the author of this message via ITS.



Dr. Carlos Woodruff





Your patient has troponin level(s) of 0.132; 0.131; 0.141, 8/12.  Please clarify
if there is an additional diagnosis and/or clinical significance related to this
value.



Patient History/Risk Factors:  80-year-old male presents to the ED via EMS from 
[facility] for being barely responsive. Medical History: Atrial Fibrillation, Heart 
Failure and DM2 



Clinical Indicators:

Mildly elevated troponin secondary to heart failure. Medicine progress notes 
8/13, 8/14 & 8/15

Troponin: 0.132; 0.131; 0.141 on 8/12

BNP: 88176 on 8/12

EKG Results: Sinus tachycardia wit short NE; Right superior axis deviation; 
Pulmonary disease pattern; Inferior infarct, age undetermined



Treatment: 8/12 06:28 40Mg IV x 1 STA; 8/12 14:16 Lasix 40Mg x 1 STA; 8/12 
Lopressor 5Mg IVP Q8HR YUE d/c 8/15; Lopressor 75Mg PO TID YUE to current.





Please clarify the etiology of the Type 2 MI, if known:



   [  ]  Type 2 MI due to Heart Failure

   [  ]  Type 2 MI due to other (please specify __________)

   [  x]  Troponemia, not clinically significant

   [  ]  Unable to determine

   [  ]  Other Condition, please specify



(Template Last Revised: March 2021)

MTDD

## 2021-08-16 NOTE — P.PN
Subjective





HISTORY OF PRESENTING ILLNESS


This is an 80-year-old gentleman who presented from [facility] with 

unresponsiveness.  It is unclear how long he's been unresponsive and it is 

unclear what his baseline mental status is.  He apparently up until recently was

a no code however the wife changed his status to a full code.  His history and 

HPI were obtained from the chart.  There is also no family available at this 

time although the wife is apparently on her way.  Reviewing the medications it 

appears patient has a history of dementia, hyperlipidemia, hypertension and 

diabetes.  Labs on admission showed a white blood cell count of 15,000, sodium 

150, BUN 39, creatinine 1.09, magnesium 2.4.  Plasma lactic acid level 2.9.  Her

consult for elevated troponins which came back at 0.132 and 0.131.  NT proBNP 

was also elevated at 23,000 above the patient does not appear to be in failure. 

There is no evidence of edema no evidence of orthopnea and no evidence of 

respiratory distress.  His oxygen saturation is  on 2 L via nasal cannula.

 EKG on admission was read as sinus tachycardia with short PA however upon 

review appears to be atrial tachycardia with 2 to one conduction.  Patient is 

currently in atrial flutter with 2-1 conduction on telemetry.  Heart rate is in 

the 120s.





08/16/2021


Pt seen and examined sitting up in bed in no acute distress. Blood pressure 

decreased this morning, lopressor was held. Heart rates on telemetry between 

100-107 currently. He denies chest pain, shortness of breath, dizziness or 

palpitations. 





PHYSICAL EXAMINATION


Vital signs reviewed.


CONSTITUTIONAL: No apparent distress, frail. 


HEENT: Head is normocephalic. Pupils are equal, round. Sclerae anicteric. Mucous

membranes of the mouth are moist.  No JVD. No carotid bruit.


CHEST EXAMINATION: Decreased breath sounds bilaterally.


HEART EXAMINATION: Regular rate and rhythm. S1, S2 heard. Systolic ejection 

murmur noted, no gallops or rub.


EXTREMITIES: no lower extremity edema and no calf tenderness.





ASSESSMENT


Decreased level of consciousness


Atrial flutter of unknown duration


Hypertension


Dementia


Hyperlipidemia


Diabetes mellitus type 2


Elevated troponins not consistent with acute coronary event


Cardiomyopathy, unknown type. Likely tachycardia induced


Positive blood cultures, likely contamination per ID





Plan


Continue amiodarone as previously ordered. 


Given beta blockers if blood pressure is above 90 systolic. 


Consider comfort care measures. 


Clinically stable. 





Nurse Practitioner note has been reviewed, I agree with a documented findings 

and plan of care.  Patient was seen and examined.








Objective





- Vital Signs


Vital signs: 


                                   Vital Signs











Temp  97.9 F   08/15/21 20:00


 


Pulse  64   08/16/21 08:00


 


Resp  18   08/16/21 08:00


 


BP  87/52   08/16/21 08:00


 


Pulse Ox  98   08/16/21 08:00








                                 Intake & Output











 08/15/21 08/16/21 08/16/21





 18:59 06:59 18:59


 


Intake Total  480 400


 


Output Total 650 600 200


 


Balance -650 -120 200


 


Weight  55.5 kg 


 


Intake:   


 


  Oral  480 400


 


Output:   


 


  Urine 650 600 200


 


Other:   


 


  Voiding Method Indwelling Catheter Indwelling Catheter Indwelling Catheter














- Labs


CBC & Chem 7: 


                                 08/14/21 07:38





                                 08/15/21 07:06


Labs: 


                  Abnormal Lab Results - Last 24 Hours (Table)











  08/15/21 08/15/21 08/15/21 Range/Units





  11:45 16:53 20:36 


 


POC Glucose (mg/dL)  151 H  312 H  206 H  (75-99)  mg/dL














  08/16/21 08/16/21 08/16/21 Range/Units





  06:18 06:43 06:57 


 


POC Glucose (mg/dL)  62 L  69 L  74 L  (75-99)  mg/dL














  08/16/21 08/16/21 Range/Units





  08:49 11:35 


 


POC Glucose (mg/dL)  131 H  195 H  (75-99)  mg/dL








                      Microbiology - Last 24 Hours (Table)











 08/14/21 07:38 Blood Culture - Preliminary





 Blood    No Growth after 48 hours


 


 08/13/21 12:45 Blood Culture - Preliminary





 Blood    No Growth after 48 hours

## 2021-08-16 NOTE — P.PN
Subjective





Patient is seen in follow-up for hypernatremia which has resolved.  Oral intake 

fair but has to be fed.  Now on oral amiodarone.  Heart rate stable.  

Nonoliguric.  No changes overnight.





Vital signs are stable.


General: The patient appeared well nourished and normally developed. 


HEENT: Head exam is unremarkable. 


LUNGS: Breath sounds decreased.


HEART: Irregular rate and rhythm.


ABDOMEN: Soft, no distention.


EXTREMITITES: No edema.





Objective





- Vital Signs


Vital signs: 


                                   Vital Signs











Temp  97.9 F   08/15/21 20:00


 


Pulse  64   08/16/21 08:00


 


Resp  18   08/16/21 08:00


 


BP  87/52   08/16/21 08:00


 


Pulse Ox  98   08/16/21 08:00








                                 Intake & Output











 08/15/21 08/16/21 08/16/21





 18:59 06:59 18:59


 


Intake Total  480 


 


Output Total 650 600 200


 


Balance -650 -120 -200


 


Weight  55.5 kg 


 


Intake:   


 


  Oral  480 


 


Output:   


 


  Urine 650 600 200


 


Other:   


 


  Voiding Method Indwelling Catheter Indwelling Catheter Indwelling Catheter














- Labs


CBC & Chem 7: 


                                 08/14/21 07:38





                                 08/15/21 07:06


Labs: 


                  Abnormal Lab Results - Last 24 Hours (Table)











  08/15/21 08/15/21 08/15/21 Range/Units





  11:45 16:53 20:36 


 


POC Glucose (mg/dL)  151 H  312 H  206 H  (75-99)  mg/dL














  08/16/21 08/16/21 08/16/21 Range/Units





  06:18 06:43 06:57 


 


POC Glucose (mg/dL)  62 L  69 L  74 L  (75-99)  mg/dL








                      Microbiology - Last 24 Hours (Table)











 08/13/21 12:45 Blood Culture - Preliminary





 Blood    No Growth after 48 hours


 


 08/14/21 07:38 Blood Culture - Preliminary





 Blood    No Growth after 24 hours


 


 08/12/21 01:56 Blood Culture Gram Stain - Final





 Blood Blood Culture - Final





    Staphylococcus epidermidis














Assessment and Plan


Plan: 





Assessment: 


1.  Hypernatremia from lack of oral water intake.  Improved.


2.  Hypokalemia from poor intake.  Replace.  Better.


3.  A. fib with RVR maintained on oral amiodarone, Lopressor.





Plan:


Encourage oral intake, including free water.


No changes from nephrology standpoint.


Will see on as needed basis.

## 2021-08-16 NOTE — PN
PROGRESS NOTE



DATE OF SERVICE:

08/16/2021



REASON FOR FOLLOWUP:

1. Positive blood culture, more likely contaminant.

2. Possible pneumonia.



INTERVAL HISTORY:

The patient is afebrile. The patient is currently more awake and alert. He is breathing

comfortably. Now specifically denies having any chest pain or cough.  No abdominal pain

or diarrhea.



PHYSICAL EXAMINATION:

On examination, blood pressure is 85/58 with a pulse of 107, temperature 97.1.  He is

98% on 2 L nasal cannula.

GENERAL DESCRIPTION: General description is an elderly male lying in bed in no

distress.

RESPIRATORY SYSTEM: Unlabored breathing with decreased breath sounds at the bases. No

wheeze.

HEART: S1, S2.  Regular rate and rhythm.

ABDOMEN:  Soft. No tenderness.



EXTREMITIES: No edema of the feet.



LABS:

No new labs have been obtained today.



DIAGNOSTIC IMPRESSION AND PLAN:

1. Patient with a positive blood culture with Staph epidermidis, likely contamination.

    Repeat blood culture negative.  No need for vancomycin.

2. Patient with possible pneumonia, covered with Rocephin; to continue and _____ short

    course of oral Ceftin on discharge.





MMODL / IJN: [id number] / Job#: [id number]

## 2021-08-16 NOTE — P.PN
Subjective





Patient is a 80-year-old the male came in because patient is barely responsive. 

When I valid the patient patient does live ache answering minimal the questions.

 Patient is a nursing home resident.  Patient was transferred to nursing home 

after his inpatient hospitalization at an outside facility.  I'm unable to get 

much history from wife either.  As per the wife patient was eating patient 

became much less responsive and was sent in to the hospital patient had a chest 

x-ray, which looks like heart failure because of which I ordered a BNP which is 

highly elevated to 23,000 patient will be given a dose of Lasix.  IV fluids at 

this time.  Patient also found to have elevated white blood glucose of 322.  

Patient is receiving normal saline at 1 36/h which will be discontinued at this 

time patient was later switched to D5 half-normal saline which will risk and 

urine as well.  Patient had a computed tomography scan of the head which 

revealed degenerative changes with nonspecific white matter changes.  Patient 

does haven't by dilatation because of age-related went up to dilatation and 

probably dementia.  Neurology will evaluate the patient as well





08/13/2021





Patient mental status remains the same.  Patient blood cultures came back pos

itive for gram-positive cocci in clusters can be contamination as a tone in came

back at 0.19 which indicates low chance for any bacterial infection.  Patient's 

creatinine did improve with IV Lasix.  Echocardiogram did not show any more IVC 

dilatation because of which the patient will not be given any Lasix at this 

time.  Patient is in A. fib atrial fibrillation for which patient was started on

amiodarone by cardiology.  Patient was evaluated by neurology, infectious 

disease is currently being consulted as well because of bacteremia, repeat blood

cultures will be obtain for today and tomorrow.  Patient blood sugars are bit 

better today as patient is not eating be probably need to back off on his 

diabetic regimen.  Patient will be just started on sliding scale insulin, 

Giardia and's will be held.





08/14/2021


Patient when the status improved quite a bit patient is more awake today patient

had an EEG which showed a encephalopathy most probably metabolic.  Patient 

awaiting diet now.  Patient's pro calcitonin is not consistent with infection 

patient blood cultures are positive for staph epidermidis which is a 

contamination probably can this can you vancomycin.  Patient main issue appears 

to be metabolic encephalopathy from possible CHF and hyperglycemia.  Patient 

remains on amiodarone drip.  Heart rate is still bit elevated.





08/15/2021


Patient's blood sugars trended up as patient started eating.  Patient will be 

started on long-acting insulin 12 units.  Patient's vancomycin was discontinued 

no evidence of infection patient's pro calcitonin is around 0.19.  Physical 

therapy and occupational therapy will evaluate the patient patient to probably 

can be discharged in a day or 2.  Patient's blood pressure is on the low side IV

metoprolol will be discontinued will continue to hold metoprolol and amiodarone





08/16/2021


Patient was bit hypotensive today and his blood sugars were also low.  Patient 

blood sugars are highly fluctuating I'll cut down the dose of long-acting 

insulin to 8 units I believe patient probably will be needing around 10 units of

insulin.  Physical therapy  patient that we will evaluate the patient for 

possibility of discharge to rehab tomorrow.  Patient the heart rate is well 

controlled patient is on metoprolol.  She appears to tighten week and this 

probably is his baseline.  Patient saturations are much better since admission.





Constitutional: Denied any fatigue denied any fever.


Cardio vascular: denied any chest pain, palpitations


Gastrointestinal denied any nausea vomiting


Pulmonary: Denied any shortness of breath cough


Neurologic denied any new focal deficits





All inpatient medications were reviewed and appropriate changes in these medi

cations as dictated in the interval history and assessment and plan.








PHYSICAL EXAMINATION: 





GENERAL: Patient is bit arousable now and response now I believe he is answering

appropriately but unable to is his orientation, not in any acute distress.  Thin

built cachectic 


HEENT: Pupils are round and equally reacting to light. EOMI. No scleral icterus.

No conjunctival pallor. Normocephalic, atraumatic. No pharyngeal erythema. No 

thyromegaly. 


CARDIOVASCULAR: S1 and S2 present. No murmurs, rubs, or gallops. 


PULMONARY: Chest is clear to auscultation, no wheezing or crackles. 


ABDOMEN: Soft, nontender, nondistended, normoactive bowel sounds. No palpable 

organomegaly. 


MUSCULOSKELETAL: No joint swelling or deformity.


EXTREMITIES: No cyanosis, clubbing, or pedal edema. 


NEUROLOGICAL: Unable to assess mental status as mentioned above arousable and 

responsive


SKIN: No rashes. 





Assessment and plan


-Altered mental status, state of unresponsiveness: Patient appears to have 

severe metabolic encephalopathy, significant improvement since admission.  

Severe metabolic is  is probably related to heart failure and hyperglycemia.  

Patient is now eating patient was started on sliding scale insulin, adding long-

acting insulin.   Patient appears to have contaminated blood sample and positive

cultures with staph epidermidis.  Mycin was discontinued


-Acute hypoxic respiratory failure , patient is saturating well now probably 

related to congestive heart failure, unable to assess ejection fraction and 

echocardiogram patient is in atrial fibrillation for which patient is on 

amiodarone, patient doesn't have any dilated IVC, will not continue any more 

Lasix hypoxia appears to have improved since starting it  99% on 2 L patient's 

blood pressure is borderline.  May have had a diastolic dysfunction heart 

failure exacerbation from atrial fibrillation


-Paroxysmal A. fib: Presently rapid regular rate patient was started on 

amiodarone, patient is on anticoagulation.


-Mildly elevated troponin secondary to heart failure.


-Type 2 diabetes mellitus , patient blood sugars are well controlled to go up as

patient is tolerating diet at this time.  Started on sliding scale insulin


-Possible dementia from the wife's history patient appears to have advanced 

dementia


-Leukocytosis reactive so far no clear source of infection


Generalized deconditioning: Secondary days related muscle atrophy and dementia 

this is probably his baseline patient will hold all prognosis is poor patient 

probably can be discharged to subacute rehabitation tomorrow.


DVT prophylaxis: Patient will be started on subcutaneous heparin


-Patient is DO NOT RESUSCITATE discussed with the wife.





Objective





- Vital Signs


Vital signs: 


                                   Vital Signs











Temp  97.9 F   08/15/21 20:00


 


Pulse  64   08/16/21 08:00


 


Resp  18   08/16/21 08:00


 


BP  87/52   08/16/21 08:00


 


Pulse Ox  98   08/16/21 08:00








                                 Intake & Output











 08/15/21 08/16/21 08/16/21





 18:59 06:59 18:59


 


Intake Total  480 400


 


Output Total 650 600 200


 


Balance -650 -120 200


 


Weight  55.5 kg 


 


Intake:   


 


  Oral  480 400


 


Output:   


 


  Urine 650 600 200


 


Other:   


 


  Voiding Method Indwelling Catheter Indwelling Catheter Indwelling Catheter














- Labs


CBC & Chem 7: 


                                 08/14/21 07:38





                                 08/15/21 07:06


Labs: 


                  Abnormal Lab Results - Last 24 Hours (Table)











  08/15/21 08/15/21 08/15/21 Range/Units





  11:45 16:53 20:36 


 


POC Glucose (mg/dL)  151 H  312 H  206 H  (75-99)  mg/dL














  08/16/21 08/16/21 08/16/21 Range/Units





  06:18 06:43 06:57 


 


POC Glucose (mg/dL)  62 L  69 L  74 L  (75-99)  mg/dL














  08/16/21 08/16/21 Range/Units





  08:49 11:35 


 


POC Glucose (mg/dL)  131 H  195 H  (75-99)  mg/dL








                      Microbiology - Last 24 Hours (Table)











 08/14/21 07:38 Blood Culture - Preliminary





 Blood    No Growth after 48 hours


 


 08/13/21 12:45 Blood Culture - Preliminary





 Blood    No Growth after 48 hours

## 2021-08-17 NOTE — P.PN
Subjective





HISTORY OF PRESENTING ILLNESS


This is an 80-year-old gentleman who presented from [facility] with 

unresponsiveness.  It is unclear how long he's been unresponsive and it is 

unclear what his baseline mental status is.  He apparently up until recently was

a no code however the wife changed his status to a full code.  His history and 

HPI were obtained from the chart.  There is also no family available at this 

time although the wife is apparently on her way.  Reviewing the medications it 

appears patient has a history of dementia, hyperlipidemia, hypertension and 

diabetes.  Labs on admission showed a white blood cell count of 15,000, sodium 

150, BUN 39, creatinine 1.09, magnesium 2.4.  Plasma lactic acid level 2.9.  Her

consult for elevated troponins which came back at 0.132 and 0.131.  NT proBNP 

was also elevated at 23,000 above the patient does not appear to be in failure. 

There is no evidence of edema no evidence of orthopnea and no evidence of 

respiratory distress.  His oxygen saturation is  on 2 L via nasal cannula.

 EKG on admission was read as sinus tachycardia with short LA however upon 

review appears to be atrial tachycardia with 2 to one conduction.  Patient is 

currently in atrial flutter with 2-1 conduction on telemetry.  Heart rate is in 

the 120s.





08/17/2021


Pt seen and examined sitting up in bed in no acute distress. Blood pressure 

decreased this morning, lopressor was held. Heart rates on telemetry low 100s. 

He denies chest pain, shortness of breath, dizziness or palpitations. 





PHYSICAL EXAMINATION


Vital signs reviewed.


CONSTITUTIONAL: No apparent distress, frail. 


HEENT: Head is normocephalic. Pupils are equal, round. Sclerae anicteric. Mucous

membranes of the mouth are moist.  No JVD. No carotid bruit.


CHEST EXAMINATION: Decreased breath sounds bilaterally.


HEART EXAMINATION: Regular rate and rhythm. S1, S2 heard. Systolic ejection 

murmur noted, no gallops or rub.


EXTREMITIES: no lower extremity edema and no calf tenderness.





ASSESSMENT


Decreased level of consciousness


Atrial flutter of unknown duration


Hypertension


Dementia


Hyperlipidemia


Diabetes mellitus type 2


Elevated troponins not consistent with acute coronary event


Cardiomyopathy, unknown type. Likely tachycardia induced


Positive blood cultures, likely contamination per ID





Plan


Continue amiodarone as previously ordered. 


Decrease lopressor to 50 mg TID, hold for systolic bp less than 90.  


Consider comfort care measures. 


Clinically stable. 


We will follow along as needed, please call with further questions or concerns. 





Nurse Practitioner note has been reviewed, I agree with a documented findings 

and plan of care.  Patient was seen and examined.








Objective





- Vital Signs


Vital signs: 


                                   Vital Signs











Temp  98.3 F   08/16/21 20:00


 


Pulse  108 H  08/17/21 04:00


 


Resp  18   08/17/21 04:00


 


BP  84/57   08/17/21 04:00


 


Pulse Ox  97   08/17/21 04:00








                                 Intake & Output











 08/16/21 08/17/21 08/17/21





 18:59 06:59 18:59


 


Intake Total 1320 480 


 


Output Total 500 525 


 


Balance 820 -45 


 


Weight 55.5 kg 52 kg 


 


Intake:   


 


  Oral 1320 480 


 


Output:   


 


  Urine 500 525 


 


Other:   


 


  Voiding Method Indwelling Catheter Indwelling Catheter 


 


  # Bowel Movements  1 














- Labs


CBC & Chem 7: 


                                 08/17/21 07:51





                                 08/17/21 07:51


Labs: 


                  Abnormal Lab Results - Last 24 Hours (Table)











  08/16/21 08/16/21 08/16/21 Range/Units





  08:49 11:35 17:14 


 


RBC     (4.30-5.90)  m/uL


 


Hgb     (13.0-17.5)  gm/dL


 


Hct     (39.0-53.0)  %


 


RDW     (11.5-15.5)  %


 


Plt Count     (150-450)  k/uL


 


Lymphocytes #     (1.0-4.8)  k/uL


 


POC Glucose (mg/dL)  131 H  195 H  223 H  (75-99)  mg/dL














  08/16/21 08/16/21 08/17/21 Range/Units





  20:19 23:19 06:15 


 


RBC     (4.30-5.90)  m/uL


 


Hgb     (13.0-17.5)  gm/dL


 


Hct     (39.0-53.0)  %


 


RDW     (11.5-15.5)  %


 


Plt Count     (150-450)  k/uL


 


Lymphocytes #     (1.0-4.8)  k/uL


 


POC Glucose (mg/dL)  290 H  179 H  107 H  (75-99)  mg/dL














  08/17/21 Range/Units





  07:51 


 


RBC  3.85 L  (4.30-5.90)  m/uL


 


Hgb  11.5 L  (13.0-17.5)  gm/dL


 


Hct  36.1 L  (39.0-53.0)  %


 


RDW  16.5 H  (11.5-15.5)  %


 


Plt Count  135 L  (150-450)  k/uL


 


Lymphocytes #  0.9 L  (1.0-4.8)  k/uL


 


POC Glucose (mg/dL)   (75-99)  mg/dL








                      Microbiology - Last 24 Hours (Table)











 08/13/21 12:45 Blood Culture - Preliminary





 Blood    No Growth after 72 hours


 


 08/14/21 07:38 Blood Culture - Preliminary





 Blood    No Growth after 48 hours

## 2021-08-17 NOTE — PN
PROGRESS NOTE



DATE OF SERVICE:

08/17/2021



REASON FOR FOLLOWUP:

1. Positive blood culture likely contamination.

2. Possible pneumonia.



INTERVAL HISTORY:

The patient is afebrile.  The patient is more awake and alert today.  He is breathing

comfortably.  Denies having any chest pain or any cough.  No abdominal pain.  No

diarrhea has been reported.



PHYSICAL EXAMINATION:

Blood pressure 102/69, pulse of 102, temperature 98.  He is 99% on 2 L nasal cannula.

General description is an elderly male lying in bed in no distress.  Respiratory

system: Unlabored breathing, decreased breath sounds at the base, no wheeze.  Heart S1,

S2.  Regular rate and rhythm. Abdomen soft, no tenderness.



LABS:

Hemoglobin 11.4, white count 6.2, BUN of 37, creatinine 0.75.



DIAGNOSTIC IMPRESSION AND PLAN:

1. Patient with a positive blood culture, likely contamination.  Repeat blood cultures

    have been negative.

2. Possible pneumonia, covered with Rocephin.  Will transition to oral Ceftin on

    discharge.  Daughter at the bedside, questions were answered.





MMODL / IJN: [id number] / Job#: [id number]

## 2021-08-17 NOTE — P.PN
Subjective





This is a pleasant 80 years old male with multiple medical problems including 

dementia, paroxysmal atrial fibrillation.  Presents on 8/12 for altered mental 

status and decreased level of consciousness and unresponsiveness.  Patient was 

found to have acute CHF and possible pneumonia, metabolic encephalopathy on the 

top of his dementia which is improving.  Paroxysmal atrial fibrillation on 

anticoagulation.  He had positive blood cultures which was thought to be 

contamination by ID team.  This morning he is lethargic and minimally responsive

which looks like stable for him.  This could be related to his advanced dementia

and multiple medical problems.  He follows simple commands, but not all the 

times.


His generally weak and lethargic.


His been evaluated by several consultants including pulmonary, nephrology signed

off.  The dosage team they recommended today to continue with amiodarone 400 mg 

twice daily while lower the dose of metoprolol 75 mg 3 times a day down to 50 mg

3 times a day.  Probably because blood pressure on the low side.  Neurologist 

also evaluated the patient and started him on high-dose donepezil and Namenda.


This morning he was more hypoglycemic at 62 and 76.  He was getting Levemir 8 

units at bedtime which was held yesterday however he received 8 units per 

sliding scale last night.


We going to discontinue Levemir and keep the patient on sliding scale while keep

monitoring.


If his blood pressure on a glucose improved tomorrow they might be considered 

for discharge


Currently patient is no code, comfort measures recommended by consultants for 

example cardiologist. 











Objective





- Vital Signs


Vital signs: 


                                   Vital Signs











Temp  98.3 F   08/16/21 20:00


 


Pulse  108 H  08/17/21 08:00


 


Resp  16   08/17/21 08:00


 


BP  92/63   08/17/21 08:00


 


Pulse Ox  95   08/17/21 08:00








                                 Intake & Output











 08/16/21 08/17/21 08/17/21





 18:59 06:59 18:59


 


Intake Total 1320 480 


 


Output Total 500 525 


 


Balance 820 -45 


 


Weight 55.5 kg 52 kg 


 


Intake:   


 


  Oral 1320 480 


 


Output:   


 


  Urine 500 525 


 


Other:   


 


  Voiding Method Indwelling Catheter Indwelling Catheter Indwelling Catheter


 


  # Bowel Movements  1 














- Exam





-GENERAL: The patient is awake but lethargic.  Poor historian, follow some 

commands.  Cachectic


HEENT: Pupils are round and equally reacting to light. EOMI. No scleral icterus.

No conjunctival pallor. Normocephalic, atraumatic. No pharyngeal erythema. No 

thyromegaly. 


CARDIOVASCULAR: S1 and S2 present. No murmurs, rubs, or gallops. 


PULMONARY: Chest is clear to auscultation, no wheezing or crackles. 


ABDOMEN: Soft, nontender, nondistended, normoactive bowel sounds. No palpable 

organomegaly. 


MUSCULOSKELETAL: No joint swelling or deformity. 


EXTREMITIES: No cyanosis, clubbing, or pedal edema. 


NEUROLOGICAL: Gross neurological examination did not reveal any focal deficits. 


SKIN: No rashes. no petechiae.





- Labs


CBC & Chem 7: 


                                 08/17/21 07:51





                                 08/17/21 07:51


Labs: 


                  Abnormal Lab Results - Last 24 Hours (Table)











  08/16/21 08/16/21 08/16/21 Range/Units





  17:14 20:19 23:19 


 


RBC     (4.30-5.90)  m/uL


 


Hgb     (13.0-17.5)  gm/dL


 


Hct     (39.0-53.0)  %


 


RDW     (11.5-15.5)  %


 


Plt Count     (150-450)  k/uL


 


Lymphocytes #     (1.0-4.8)  k/uL


 


Carbon Dioxide     (22-30)  mmol/L


 


BUN     (9-20)  mg/dL


 


Glucose     (74-99)  mg/dL


 


POC Glucose (mg/dL)  223 H  290 H  179 H  (75-99)  mg/dL


 


Calcium     (8.4-10.2)  mg/dL


 


C-Reactive Protein     (<1.0)  mg/dL














  08/17/21 08/17/21 08/17/21 Range/Units





  06:15 07:51 07:51 


 


RBC    3.85 L  (4.30-5.90)  m/uL


 


Hgb    11.5 L  (13.0-17.5)  gm/dL


 


Hct    36.1 L  (39.0-53.0)  %


 


RDW    16.5 H  (11.5-15.5)  %


 


Plt Count    135 L  (150-450)  k/uL


 


Lymphocytes #    0.9 L  (1.0-4.8)  k/uL


 


Carbon Dioxide   31 H   (22-30)  mmol/L


 


BUN   37 H   (9-20)  mg/dL


 


Glucose   62 L   (74-99)  mg/dL


 


POC Glucose (mg/dL)  107 H    (75-99)  mg/dL


 


Calcium   7.9 L   (8.4-10.2)  mg/dL


 


C-Reactive Protein   2.3 H   (<1.0)  mg/dL








                      Microbiology - Last 24 Hours (Table)











 08/14/21 07:38 Blood Culture - Preliminary





 Blood    No Growth after 72 hours


 


 08/13/21 12:45 Blood Culture - Preliminary





 Blood    No Growth after 72 hours














Assessment and Plan


Assessment: 





Advanced dementia


Metabolic encephalopathy, resolved


Acute cardiomyopathy, unknown type per cardiologist.  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 

induced


atrial flutter of unknown duration


elevated troponin, not consistent with acute coronary event


Possible pneumonia


The disc mellitus with hypoglycemia


Positive blood culture, contamination


Sacral pressure ulcer, stage II


Right heel pressure ulcer, stage II





Plan: 





This is a pleasant 80 years old male presents with multiple problems with CHF, 

A. fib, high troponin.  Encephalopathy and dementia.


Patient currently With ceftriaxone to be swished oral Ceftin for short course 

upon discharge by ID team


Decrease metoprolol by cardiologist, follow-up blood pressure


Discontinue Levemir and follow-up glucose level


Patient was cleared for discharge by consultants including pulmonary, cardiology

and nephrology


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


Labs and medication were reviewed..  Continue same treatment.  Continue with 

symptomatic treatment.  Resume home medication.  Monitor lytes and vitals.  DVT 

and GI prophylaxis.  Further recommendationsas per clinical course of the 

patient


DVT prophylaxis: Subcutaneous heparin


GI Prophylaxis: Ppi


PT/OT: Needs placement


Prognosis is guarded and very poor for short term and long-term

## 2021-08-17 NOTE — P.CONS
History of Present Illness





- Reason for Consult


Consult date: 08/17/21


wound care





- History of Present Illness


Is an 80-year-old male who is a poor historian and difficult to interview due to

inability to follow commands.  Patient has a past medical history of atrial 

fibrillation, heart failure, dementia, diabetes mellitus.  He is being seen for 

a nonhealing pressure ulcer stage II to the midline sacrum.  Unable to obtain 

how long the ulceration has been there.  Ulceration measures approximately 4 x 6

x 0.2 cm with significant amount of slough and necrotic tissue within the wound 

bed and minimal granulation.  The wound edges are attached to the wound base no 

tunneling or undermining noted.  The Periwound does show some excoriation. 





Review of systems:


Unable to obtain due to mental status





Physical exam:


General Appearance: Alert, cooperative, no distress, appears stated age.


Skin: See HPI all other Skin color, texture, tugor normal, no rashes or lesions.


Neurologic: Alert oriented x3 





Assessment:


1.  Pressure ulcer sacrum stage II


2.  Diabetes with skin ulcer





Plan:


1.  Apply honey alginate, saline moistened gauze, border foam change Tuesday Thursday Saturday.  Turn patient every 2 hours.  Add boost to his meals to help 

with increase and protein consumption.  Utilize a overlay mattress or a air loss

mattress.  Utilize a Roho cushion or waffle cushion when sitting.  Patient would

benefit from weekly debridements.  We would be happy to see him in the wound 

care center upon discharge.





Thank you for the consultation any questions please contact the wound care 

center





DNP note has been reviewed and discussed with Dr. Gonzalez and the impression 

and plan of care has been directed as dictated. 








Past Medical History


Past Medical History: Atrial Fibrillation, Heart Failure, Dementia, Diabetes 

Mellitus


History of Any Multi-Drug Resistant Organisms: None Reported


Past Surgical History: No Surgical Hx Reported


Past Anesthesia/Blood Transfusion Reactions: No Reported Reaction


Past Psychological History: No Psychological Hx Reported


Smoking Status: Unknown if ever smoked


Past Alcohol Use History: None Reported


Past Drug Use History: None Reported





Medications and Allergies


                                Home Medications











 Medication  Instructions  Recorded  Confirmed  Type


 


Aspirin 81 mg DAILY 08/12/21 08/12/21 History


 


Donepezil HCl [Aricept] 10 mg PO BID@0700,2000 08/12/21 08/12/21 History


 


Empagliflozin [Jardiance] 25 mg PO DAILY 08/12/21 08/12/21 History


 


Healthshake 1 dose PO TID@0800,1300,1900 08/12/21 08/12/21 History


 


Memantine HCl 10 mg PO BID@0700,2000 08/12/21 08/12/21 History


 


Metoprolol Tartrate [Lopressor] 75 mg PO TID@0700,1300,2000 08/12/21 08/12/21 

History


 


Potassium Chloride ER [K-Dur 20] 20 mg DAILY 08/12/21 08/12/21 History


 


Simvastatin [Zocor] 40 mg PO HS@2000 08/12/21 08/12/21 History


 


lisinopriL 10 mg PO AS DIRECTED 08/12/21 08/12/21 History


 


metFORMIN  mg PO BID 08/12/21 08/12/21 History


 


Amiodarone [Cordarone] 400 mg PO BID #180 tab 08/17/21  Rx








                                    Allergies











Allergy/AdvReac Type Severity Reaction Status Date / Time


 


amoxicillin Allergy  Unknown Verified 08/12/21 07:17














Physical Exam


Vitals: 


                                   Vital Signs











  Temp Pulse Resp BP Pulse Ox


 


 08/17/21 08:00   108 H  16  92/63  95


 


 08/17/21 04:00   108 H  18  84/57  97


 


 08/17/21 00:00   99  18  100/66  98


 


 08/16/21 20:00  98.3 F  107 H  18  98/66  99


 


 08/16/21 18:05     125/69 


 


 08/16/21 16:00  97.1 F L  107 H  18  85/50  98


 


 08/16/21 14:00   87  18  


 


 08/16/21 12:00  98.1 F  87  18  92/66  98








                                Intake and Output











 08/16/21 08/17/21 08/17/21





 22:59 06:59 14:59


 


Intake Total 480  


 


Output Total 300 525 


 


Balance 180 -525 


 


Intake:   


 


  Oral 480  


 


Output:   


 


  Urine 300 525 


 


Other:   


 


  Voiding Method Indwelling Catheter Indwelling Catheter Indwelling Catheter


 


  # Bowel Movements 1  


 


  Weight  52 kg 














Results


CBC & Chem 7: 


                                 08/17/21 07:51





                                 08/17/21 07:51


Labs: 


                  Abnormal Lab Results - Last 24 Hours (Table)











  08/16/21 08/16/21 08/16/21 Range/Units





  17:14 20:19 23:19 


 


RBC     (4.30-5.90)  m/uL


 


Hgb     (13.0-17.5)  gm/dL


 


Hct     (39.0-53.0)  %


 


RDW     (11.5-15.5)  %


 


Plt Count     (150-450)  k/uL


 


Lymphocytes #     (1.0-4.8)  k/uL


 


Carbon Dioxide     (22-30)  mmol/L


 


BUN     (9-20)  mg/dL


 


Glucose     (74-99)  mg/dL


 


POC Glucose (mg/dL)  223 H  290 H  179 H  (75-99)  mg/dL


 


Calcium     (8.4-10.2)  mg/dL


 


C-Reactive Protein     (<1.0)  mg/dL














  08/17/21 08/17/21 08/17/21 Range/Units





  06:15 07:51 07:51 


 


RBC    3.85 L  (4.30-5.90)  m/uL


 


Hgb    11.5 L  (13.0-17.5)  gm/dL


 


Hct    36.1 L  (39.0-53.0)  %


 


RDW    16.5 H  (11.5-15.5)  %


 


Plt Count    135 L  (150-450)  k/uL


 


Lymphocytes #    0.9 L  (1.0-4.8)  k/uL


 


Carbon Dioxide   31 H   (22-30)  mmol/L


 


BUN   37 H   (9-20)  mg/dL


 


Glucose   62 L   (74-99)  mg/dL


 


POC Glucose (mg/dL)  107 H    (75-99)  mg/dL


 


Calcium   7.9 L   (8.4-10.2)  mg/dL


 


C-Reactive Protein   2.3 H   (<1.0)  mg/dL








                      Microbiology - Last 24 Hours (Table)











 08/14/21 07:38 Blood Culture - Preliminary





 Blood    No Growth after 72 hours


 


 08/13/21 12:45 Blood Culture - Preliminary





 Blood    No Growth after 72 hours














Assessment and Plan


(1) Pressure ulcer of sacral region, stage 2


Current Visit: Yes   Status: Acute   Code(s): L89.152 - PRESSURE ULCER OF SACRAL

 REGION, STAGE 2   SNOMED Code(s): 759604619


   





(2) Diabetes with skin ulcer


Current Visit: Yes   Status: Acute   Code(s): E11.622 - TYPE 2 DIABETES MELLITUS

 WITH OTHER SKIN ULCER; L98.499 - NON-PRESSURE CHRONIC ULCER OF SKIN OF SITES W 

UNSP SEVERITY   SNOMED Code(s): 07650643

## 2021-08-18 NOTE — PN
PROGRESS NOTE



DATE OF SERVICE:

08/18/2021



REASON FOR FOLLOW UP:

1. Positive blood culture contamination.

2. Possible pneumonia.



INTERVAL HISTORY:

Patient is afebrile.  The patient is currently breathing comfortably.  The patient is

more awake and alert.  Denies any chest pain or cough.  No abdominal pain.  No

diarrhea.



EXAMINATION:

Blood pressure 107/63.  Pulse 108.  Temperature 98.1. He is 99% on 2 L nasal cannula.

General description is an elderly male lying in bed in no distress.  Respiratory

system: Unlabored breathing, decreased breath sounds at bases.  No wheeze. Heart: S1,

S2.  Regular rate and rhythm.  Abdomen soft, no tenderness.



LABS:

No new labs have been obtained. Blood culture repeat has been negative.



DIAGNOSTIC IMPRESSION AND PLAN:

1. Positive blood culture with Staph _____ likely skin contaminant.  Repeat blood

    culture negative.

2. Patient with possible pneumonia, community-acquired.  Overall improvement on

    Rocephin.  Finish therapy with a short course of oral Ceftin and close outpatient

    followup.





MMODL / IJN: [id number] / Job#: [id number]

## 2021-08-18 NOTE — P.DS
Providers


Date of admission: 


08/13/21 10:59





Attending physician: 


Migue Schmitt





Consults: 





                                        





08/12/21 03:51


Consult Physician Routine 


   Consulting Provider: Jolanta Montes


   Consult Reason/Comments: trop


   Do you want consulting provider notified?: Yes


Consult Physician Urgent 


   Consulting Provider: Isabel Levy


   Consult Reason/Comments: arf


   Do you want consulting provider notified?: Yes





08/12/21 03:53


Consult Physician Urgent 


   Consulting Provider: Jay Alaniz


   Consult Reason/Comments: ards


   Do you want consulting provider notified?: Yes





08/12/21 10:24


Consult Physician Urgent 


   Consulting Provider: Della Shipman


   Consult Reason/Comments: AMS, lethargy


   Do you want consulting provider notified?: Yes





08/13/21 11:39


Consult Physician Routine 


   Consulting Provider: Hillary Woody


   Consult Reason/Comments: Gram-positive bacteremia


   Do you want consulting provider notified?: Yes











Primary care physician: 


[facility] Course: 





Diagnoses:


Advanced dementia


Metabolic encephalopathy, resolved


Acute cardiomyopathy, unknown type per cardiologist.  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 

induced


atrial flutter of unknown duration.  Direct controlled and he'll be discharged 

on aspirin


elevated troponin, not consistent with acute coronary event


Possible pneumonia.  Improved and he is on 2 L/m with oxygen saturation of 99%


The disc mellitus with hypoglycemia


Positive blood culture, contamination.  Repeat blood cultures negative


Sacral pressure ulcer, stage II


Right heel pressure ulcer, stage II





Hospital course:


This is a pleasant 80 years old male with multiple medical problems including 

dementia, paroxysmal atrial fibrillation.  Presents on 8/12 for altered mental 

status and decreased level of consciousness and unresponsiveness.  Patient was 

found to have acute CHF and possible pneumonia, metabolic encephalopathy on the 

top of his dementia which is improving.  Paroxysmal atrial fibrillation on 

anticoagulation.  He had positive blood cultures which was thought to be 

contamination by ID team.  This morning he is lethargic and minimally responsive

which looks like stable for him.  This could be related to his advanced dementia

and multiple medical problems.  He follows simple commands, but not all the 

times.


His generally weak and lethargic.


His been evaluated by several consultants including pulmonary, nephrology signed

off.  The dosage team they recommended today to continue with amiodarone 400 mg 

twice daily while lower the dose of metoprolol 75 mg 3 times a day down to 50 mg

3 times a day.  Probably because blood pressure on the low side.  Neurologist 

also evaluated the patient and started him on high-dose donepezil and Namenda.


This morning he was more hypoglycemic at 62 and 76.  He was getting Levemir 8 

units at bedtime which was held yesterday cause of hypoglycemia.  He was let

hargic today but with the correction of his glucose is more awake and alert 

today and more appropriate, patient's office, and can't answer some questions 

appropriately although he is confused to time, place.  However he can recognize 

wife at bedside.


Discussed the case with wife at bedside and she agrees with the discharge plan


Also he is hemodynamically stable, blood pressure prior to discharge 90/65-

107/66 with heart rate .  Glucose ranging between 167 and 213.


Patient was cleared for discharge by all consultants including cardiology, 

neurology, nephrology, infectious disease team and pulmonary team's 


patient will be discharged on short course of oral antibiotics per ID team.  

However patient is high-risk for recurrent infections, multiple complications 

and hospital readmission, discussed with wife at bedside


Problems and management plan were discussed with the patient  and wife at 

bedside an she  verbalized understanding and acceptance


Patient was found stable and can be discharged to [facility] in guarded prognosis 

however he needs follow-up as an outpatient. Patient was instructed to follow up

with PCP within one week and patient agrees


We recommend patient follow up with cardiologist Dr. Castro in 2 weeks.





Physical exam 


Gen: patient is a AAOx1 no distress


CVS: S1-S2, RRR, no murmur


Lungs: B/L CTA, no wheezing


Abdomen: soft, no distention, no tenderness, positive bowel sounds


Extremity: no leg edema or induration


Musculoskeletal: Stage 2 sacral pressure ulcer, and right ankle pressure ulcer





Time spent more than 35 minutes





Patient Condition at Discharge: Fair





Plan - Discharge Summary


Discharge Rx Participant: No


New Discharge Prescriptions: 


New


   Amiodarone [Cordarone] 400 mg PO BID #180 tab





No Action


   Memantine HCl 10 mg PO BID@0700,2000


   metFORMIN  mg PO BID


   Simvastatin [Zocor] 40 mg PO HS@2000


   Donepezil HCl [Aricept] 10 mg PO BID@0700,2000


   Metoprolol Tartrate [Lopressor] 75 mg PO TID@0700,1300,2000


   Empagliflozin [Jardiance] 25 mg PO DAILY


   lisinopriL 10 mg PO AS DIRECTED


   Potassium Chloride ER [K-Dur 20] 20 mg DAILY


   Aspirin 81 mg DAILY


   Healthshake 1 dose PO TID@0800,1300,1900


Discharge Medication List





Aspirin 81 mg DAILY 08/12/21 [History]


Donepezil HCl [Aricept] 10 mg PO BID@0700,2000 08/12/21 [History]


Empagliflozin [Jardiance] 25 mg PO DAILY 08/12/21 [History]


Healthshake 1 dose PO TID@0800,1300,1900 08/12/21 [History]


Memantine HCl 10 mg PO BID@0700,2000 08/12/21 [History]


Metoprolol Tartrate [Lopressor] 75 mg PO TID@0700,1300,2000 08/12/21 [History]


Potassium Chloride ER [K-Dur 20] 20 mg DAILY 08/12/21 [History]


Simvastatin [Zocor] 40 mg PO HS@2000 08/12/21 [History]


lisinopriL 10 mg PO AS DIRECTED 08/12/21 [History]


metFORMIN  mg PO BID 08/12/21 [History]


Amiodarone [Cordarone] 400 mg PO BID #180 tab 08/17/21 [Rx]








Follow up Appointment(s)/Referral(s): 


Jordan Burrell MD [Primary Care Provider] - 1-2 days


[facility] [NON-STAFF] - As Needed

## 2021-08-18 NOTE — CDI
Documentation Clarification Form



Date: 08/18/2021 12:26:28 PM

From: Margaret Torres RN CCDS

Phone: [phone]

MRN: [id number]

Admit Date: 08/13/2021 10:59:00 AM

Patient Name: Janes Saavedra

Visit Number: [id number]

Discharge Date:  



ATTENTION: The Clinical Documentation Specialists (CDI) and [facility] Coding Staff 
appreciate your assistance in clarifying documentation. Please respond to the 
clarification below the line at the bottom and electronically sign. The CDI & 
[facility] Coding staff will review the response and follow-up if needed. Please note: 
Queries are made part of the Legal Health Record. If you have any questions, 
please contact the author of this message via ITS.



Dr. Dow E Sheet



The Registered Dietitian assessment on 8/13 indicates this patient meets 
criteria for malnutrition.  Based on this information and the findings below, is
there an additional diagnosis that is clinically appropriate for this patient?



History/Risk Factors: 80-year-old male presents to the ED via EMS from [facility] for 
being less responsive and weakness. Medical History: Atrial Fibrillation, Heart 
Failure, Dementia and DM. H&P 8/12



Clinical Indicators:  

8/17 Medicine Progress Note: The patient is awake but lethargic. Poor 
historian, follow some commands. Cachectic. 

RD Consult Assessment below 8/13

Current BMI: 19kg

Nutrition Assessment: Intake: poor; Percent consumed 0-25%, Additional comments:
Regular diet, needs to be fed by staff. Appetite Poor.

Nutrition Diagnosis Intake: Malnutrition. 

Physical Findings: Underweight. Hypermetabolism: Wounds, unstageable pressure 
injury on coccyx. 

Diagnosis Statement: Related to: Intake < 50% of estimated needs. As evidenced 
by: Fat and muscle depletion. Outcome status: Initiated.  Outcome Comment: 
Temporalis muscle, Buccal fat pads protruding clavicle. 

   

Treatment: 

Dietary Consult/Assessment: See above 

Supplements: Glucerna TID Kcal / Serving 220. Protein / Serving (gram) 10.

Lab monitoring:



Is there an additional diagnosis that is clinically appropriate for this 
patient?



   [  ]  Moderate Protein-Calorie Malnutrition

   [  ]  Severe Protein-Calorie Malnutrition

   [  ]  Other condition, please specify ___________________

   [  ]  Unable to Determine



(Template Last Revised: March 2021)

___________________________________________________________________________

Mild to moderate Protein-Calorie Malnutrition due to prolonged hospitalization, 
currently patient has good appetite and eating and improved   

MTDD

## 2021-08-19 NOTE — CDI
Documentation Clarification Form



Date: 08/19/2021 12:45:55 PM

From: Margaret Torres RN CCDS

Phone: [phone]

MRN: [id number]

Admit Date: 08/13/2021 10:59:00 AM

Patient Name: Janes Saavedra

Visit Number: [id number]

Discharge Date:  08/18/2021 05:02:00 PM





ATTENTION: The Clinical Documentation Specialists (CDI) and [facility] Coding Staff 
appreciate your assistance in clarifying documentation. Please respond to the 
clarification below the line at the bottom and electronically sign. The CDI & 
[facility] Coding staff will review the response and follow-up if needed. Please note: 
Queries are made part of the Legal Health Record. If you have any questions, 
please contact the author of this message via ITS.

The Registered Dietitian assessment on 8/13 indicates this patient meets 
criteria for malnutrition.  Based on this information and the findings below, is
there an additional diagnosis that is clinically appropriate for this patient?



History/Risk Factors: 80-year-old male [facility] 
being less responsive and weakness. Medical History: Atrial Fibrillation, Heart 
Failure, Dementia and DM. H&P 8/12



Clinical Indicators:  

8/17 Medicine Progress Note: The patient is awake but lethargic. Poor 
historian, follow some commands. Cachectic. 

RD Consult Assessment below 8/13

Current BMI: 19kg

Nutrition Assessment: Intake: poor; Percent consumed 0-25%, Additional comments:
Regular diet, needs to be fed by staff. Appetite Poor.

Nutrition Diagnosis Intake: Malnutrition. 

Physical Findings: Underweight. Hypermetabolism: Wounds, unstageable pressure 
injury on coccyx. 

Diagnosis Statement: Related to: Intake < 50% of estimated needs. As evidenced 
by: Fat and muscle depletion. Outcome status: Initiated.  Outcome Comment: 
Temporalis muscle, Buccal fat pads protruding clavicle. 

   

Treatment: 

Dietary Consult/Assessment: See above 

Supplements: Glucerna TID Kcal / Serving 220. Protein / Serving (gram) 10.



Is there an additional diagnosis that is clinically appropriate for this 
patient?



   [  ]  Mild Protein-Calorie Malnutrition

   [  ]  Moderate Protein-Calorie Malnutrition

   [  ]  Severe Protein-Calorie Malnutrition

   [  ]  Other condition, please specify ___________________

   [  ]  Unable to Determine



(Template Last Revised: March 2021)

___________________________________________________________________________



Mild Protein-Calorie Malnutrition

MTDD

## 2021-09-09 ENCOUNTER — HOSPITAL ENCOUNTER (OUTPATIENT)
Dept: HOSPITAL 47 - EC | Age: 81
Setting detail: OBSERVATION
LOS: 1 days | Discharge: HOSPICE | End: 2021-09-10
Payer: MEDICARE

## 2021-09-09 DIAGNOSIS — Z51.5: ICD-10-CM

## 2021-09-09 DIAGNOSIS — Z79.82: ICD-10-CM

## 2021-09-09 DIAGNOSIS — I67.82: ICD-10-CM

## 2021-09-09 DIAGNOSIS — E11.65: ICD-10-CM

## 2021-09-09 DIAGNOSIS — E78.5: ICD-10-CM

## 2021-09-09 DIAGNOSIS — I42.9: ICD-10-CM

## 2021-09-09 DIAGNOSIS — I11.0: ICD-10-CM

## 2021-09-09 DIAGNOSIS — I50.20: ICD-10-CM

## 2021-09-09 DIAGNOSIS — G93.41: ICD-10-CM

## 2021-09-09 DIAGNOSIS — F03.90: ICD-10-CM

## 2021-09-09 DIAGNOSIS — E87.0: ICD-10-CM

## 2021-09-09 DIAGNOSIS — E87.6: ICD-10-CM

## 2021-09-09 DIAGNOSIS — D61.818: ICD-10-CM

## 2021-09-09 DIAGNOSIS — I48.0: ICD-10-CM

## 2021-09-09 DIAGNOSIS — I48.92: Primary | ICD-10-CM

## 2021-09-09 DIAGNOSIS — L89.152: ICD-10-CM

## 2021-09-09 DIAGNOSIS — Z88.0: ICD-10-CM

## 2021-09-09 DIAGNOSIS — Z66: ICD-10-CM

## 2021-09-09 DIAGNOSIS — I95.9: ICD-10-CM

## 2021-09-09 DIAGNOSIS — R79.89: ICD-10-CM

## 2021-09-09 DIAGNOSIS — L89.612: ICD-10-CM

## 2021-09-09 DIAGNOSIS — Z79.899: ICD-10-CM

## 2021-09-09 LAB
ALBUMIN SERPL-MCNC: 2.3 G/DL (ref 3.5–5)
ALP SERPL-CCNC: 134 U/L (ref 38–126)
ALT SERPL-CCNC: 18 U/L (ref 4–49)
ANION GAP SERPL CALC-SCNC: 6 MMOL/L
APTT BLD: 18 SEC (ref 22–30)
AST SERPL-CCNC: 22 U/L (ref 17–59)
BASOPHILS # BLD AUTO: 0 K/UL (ref 0–0.2)
BASOPHILS NFR BLD AUTO: 0 %
BUN SERPL-SCNC: 43 MG/DL (ref 9–20)
CALCIUM SPEC-MCNC: 8.9 MG/DL (ref 8.4–10.2)
CHLORIDE SERPL-SCNC: 109 MMOL/L (ref 98–107)
CO2 SERPL-SCNC: 28 MMOL/L (ref 22–30)
EOSINOPHIL # BLD AUTO: 0 K/UL (ref 0–0.7)
EOSINOPHIL NFR BLD AUTO: 0 %
ERYTHROCYTE [DISTWIDTH] IN BLOOD BY AUTOMATED COUNT: 3.69 M/UL (ref 4.3–5.9)
ERYTHROCYTE [DISTWIDTH] IN BLOOD: 17.4 % (ref 11.5–15.5)
GLUCOSE BLD-MCNC: 329 MG/DL (ref 75–99)
GLUCOSE BLD-MCNC: 431 MG/DL (ref 75–99)
GLUCOSE SERPL-MCNC: 480 MG/DL (ref 74–99)
GLUCOSE UR QL: (no result)
HCT VFR BLD AUTO: 35.1 % (ref 39–53)
HGB BLD-MCNC: 11.2 GM/DL (ref 13–17.5)
HYALINE CASTS UR QL AUTO: 3 /LPF (ref 0–2)
INR PPP: 1.1 (ref ?–1.2)
LYMPHOCYTES # SPEC AUTO: 0.3 K/UL (ref 1–4.8)
LYMPHOCYTES NFR SPEC AUTO: 6 %
MAGNESIUM SPEC-SCNC: 2.1 MG/DL (ref 1.6–2.3)
MCH RBC QN AUTO: 30.3 PG (ref 25–35)
MCHC RBC AUTO-ENTMCNC: 31.9 G/DL (ref 31–37)
MCV RBC AUTO: 95 FL (ref 80–100)
MONOCYTES # BLD AUTO: 0.2 K/UL (ref 0–1)
MONOCYTES NFR BLD AUTO: 4 %
NEUTROPHILS # BLD AUTO: 5.5 K/UL (ref 1.3–7.7)
NEUTROPHILS NFR BLD AUTO: 90 %
PH UR: 5 [PH] (ref 5–8)
PLATELET # BLD AUTO: 139 K/UL (ref 150–450)
POTASSIUM SERPL-SCNC: 4.7 MMOL/L (ref 3.5–5.1)
PROT SERPL-MCNC: 5.4 G/DL (ref 6.3–8.2)
PT BLD: 11.6 SEC (ref 9–12)
RBC UR QL: 49 /HPF (ref 0–5)
SODIUM SERPL-SCNC: 143 MMOL/L (ref 137–145)
SP GR UR: 1.02 (ref 1–1.03)
SQUAMOUS UR QL AUTO: 1 /HPF (ref 0–4)
UROBILINOGEN UR QL STRIP: <2 MG/DL (ref ?–2)
WBC # BLD AUTO: 6.1 K/UL (ref 3.8–10.6)
WBC # UR AUTO: 80 /HPF (ref 0–5)

## 2021-09-09 PROCEDURE — 96366 THER/PROPH/DIAG IV INF ADDON: CPT

## 2021-09-09 PROCEDURE — 99291 CRITICAL CARE FIRST HOUR: CPT

## 2021-09-09 PROCEDURE — 80048 BASIC METABOLIC PNL TOTAL CA: CPT

## 2021-09-09 PROCEDURE — 83880 ASSAY OF NATRIURETIC PEPTIDE: CPT

## 2021-09-09 PROCEDURE — 85025 COMPLETE CBC W/AUTO DIFF WBC: CPT

## 2021-09-09 PROCEDURE — 96365 THER/PROPH/DIAG IV INF INIT: CPT

## 2021-09-09 PROCEDURE — 93005 ELECTROCARDIOGRAM TRACING: CPT

## 2021-09-09 PROCEDURE — 85730 THROMBOPLASTIN TIME PARTIAL: CPT

## 2021-09-09 PROCEDURE — 36415 COLL VENOUS BLD VENIPUNCTURE: CPT

## 2021-09-09 PROCEDURE — 81001 URINALYSIS AUTO W/SCOPE: CPT

## 2021-09-09 PROCEDURE — 96376 TX/PRO/DX INJ SAME DRUG ADON: CPT

## 2021-09-09 PROCEDURE — 71045 X-RAY EXAM CHEST 1 VIEW: CPT

## 2021-09-09 PROCEDURE — 96372 THER/PROPH/DIAG INJ SC/IM: CPT

## 2021-09-09 PROCEDURE — 83605 ASSAY OF LACTIC ACID: CPT

## 2021-09-09 PROCEDURE — 80053 COMPREHEN METABOLIC PANEL: CPT

## 2021-09-09 PROCEDURE — 83036 HEMOGLOBIN GLYCOSYLATED A1C: CPT

## 2021-09-09 PROCEDURE — 96375 TX/PRO/DX INJ NEW DRUG ADDON: CPT

## 2021-09-09 PROCEDURE — 80061 LIPID PANEL: CPT

## 2021-09-09 PROCEDURE — 70450 CT HEAD/BRAIN W/O DYE: CPT

## 2021-09-09 PROCEDURE — 96361 HYDRATE IV INFUSION ADD-ON: CPT

## 2021-09-09 PROCEDURE — 83735 ASSAY OF MAGNESIUM: CPT

## 2021-09-09 PROCEDURE — 84484 ASSAY OF TROPONIN QUANT: CPT

## 2021-09-09 PROCEDURE — 85610 PROTHROMBIN TIME: CPT

## 2021-09-09 PROCEDURE — 87086 URINE CULTURE/COLONY COUNT: CPT

## 2021-09-09 RX ADMIN — NITROGLYCERIN SCH: 20 OINTMENT TOPICAL at 21:58

## 2021-09-09 RX ADMIN — INSULIN ASPART SCH: 100 INJECTION, SOLUTION INTRAVENOUS; SUBCUTANEOUS at 19:23

## 2021-09-09 RX ADMIN — FUROSEMIDE SCH MG: 10 INJECTION, SOLUTION INTRAMUSCULAR; INTRAVENOUS at 23:29

## 2021-09-09 RX ADMIN — INSULIN ASPART SCH UNIT: 100 INJECTION, SOLUTION INTRAVENOUS; SUBCUTANEOUS at 23:28

## 2021-09-09 RX ADMIN — NITROGLYCERIN SCH INCH: 20 OINTMENT TOPICAL at 19:44

## 2021-09-09 NOTE — ED
General Adult HPI





- General


Chief complaint: Shortness of Breath


Stated complaint: Respiratory Failure


Time Seen by Provider: 09/09/21 11:04


Source: EMS, RN notes reviewed, old records reviewed


Mode of arrival: EMS


Limitations: altered mental status, physical limitation





- History of Present Illness


Initial comments: 





Patient is an 80-year-old male presenting to the emergency department by EMS.  

Patient was sent for change in mental status and shortness of breath.  Patient 

is at nursing facility with hospice care.  Patient reportedly was no code.  The

re was question if patient had advanced lung cancer.  Previous discharge summary

does not reveal this.  Patient is nonverbal at this time and provides no 

additional history.  No family present.  Reportedly wife took patient off of 

hospice care and wanted to make him a full code.  Reportedly patient son was 

opposed to this.  Neither are present at the time and her being contacted.





- Related Data


                                Home Medications











 Medication  Instructions  Recorded  Confirmed


 


Amiodarone [Cordarone] See Taper PO BID 09/09/21 09/09/21


 


Donepezil [Aricept] 10 mg PO HS@2000 09/09/21 09/09/21


 


Glucagon Emergency Kit 1 mg IM ONCE 09/09/21 09/09/21


 


Healthshake 1 dose PO TID-W/MEALS 09/09/21 09/09/21


 


Hyoscyamine Elixir [Levsin 0.125 mg PO Q4H PRN 09/09/21 09/09/21





0.125MG/ML Drops]   


 


Omeprazole Magnesium [PriLOSEC] 20 mg PO DAILY 09/09/21 09/09/21


 


Prostat Awc 1 dose PO BID@0700,1600 09/09/21 09/09/21











                                    Allergies











Allergy/AdvReac Type Severity Reaction Status Date / Time


 


amoxicillin Allergy  Unknown Verified 09/09/21 13:30














Review of Systems


ROS Statement: 


Those systems with pertinent positive or pertinent negative responses have been 

documented in the HPI.





ROS Other: All systems not noted in ROS Statement are negative.


Limitations: ROS unobtainable due to patients medical condition


Respiratory: Reports: as per HPI





Past Medical History


Past Medical History: Atrial Fibrillation, Heart Failure, Dementia, Diabetes 

Mellitus


History of Any Multi-Drug Resistant Organisms: None Reported


Past Surgical History: No Surgical Hx Reported


Past Anesthesia/Blood Transfusion Reactions: No Reported Reaction


Past Psychological History: No Psychological Hx Reported


Smoking Status: Unknown if ever smoked


Past Alcohol Use History: None Reported


Past Drug Use History: None Reported





General Exam


Limitations: altered mental status, physical limitation


General appearance: alert, other (Does not follow commands)


Head exam: Present: normocephalic


Eye exam: Present: normal appearance


Respiratory exam: Present: rhonchi


Cardiovascular Exam: Present: tachycardia


GI/Abdominal exam: Present: soft.  Absent: tenderness


Extremities exam: Present: pedal edema, other (Patient has mild anasarca)


Neurological exam: Present: alert, altered


  ** Expanded


Neurological exam: Present: protecting the airway


Motor strength exam: RUE: 3, LUE: 3, RLE: 3, LLE: 3


Eye Response: (4) open spontaneously


Motor Response: (4) withdraws to pain


Verbal Response: incomprehensible sounds


Psychiatric exam: Present: flat affect


Skin exam: Present: normal color





Course


                                   Vital Signs











  09/09/21 09/09/21





  11:03 13:19


 


Temperature 98 F 


 


Pulse Rate 110 H 110 H


 


Respiratory 14 18





Rate  


 


Blood Pressure 116/87 107/65


 


O2 Sat by Pulse 97 98





Oximetry  














EKG Findings





- EKG Comments:


EKG Findings:: Sinus rhythm with rate of 93.  .  QRS 76.  .  QTc 

457.  We'll axis.  Normal QRS.  No acute ST change.





Medical Decision Making





- Medical Decision Making





Patient reevaluated without significant change.  Wife is present and does not 

want patient placed on hospice at this time.  She does want patient to remain as

 a DO NOT RESUSCITATE status.





- Lab Data


Result diagrams: 


                                 09/09/21 11:53





                                 09/09/21 11:53


                                   Lab Results











  09/09/21 09/09/21 09/09/21 Range/Units





  11:53 11:53 11:53 


 


WBC  6.1    (3.8-10.6)  k/uL


 


RBC  3.69 L    (4.30-5.90)  m/uL


 


Hgb  11.2 L    (13.0-17.5)  gm/dL


 


Hct  35.1 L    (39.0-53.0)  %


 


MCV  95.0    (80.0-100.0)  fL


 


MCH  30.3    (25.0-35.0)  pg


 


MCHC  31.9    (31.0-37.0)  g/dL


 


RDW  17.4 H    (11.5-15.5)  %


 


Plt Count  139 L    (150-450)  k/uL


 


MPV  9.6    


 


Neutrophils %  90    %


 


Lymphocytes %  6    %


 


Monocytes %  4    %


 


Eosinophils %  0    %


 


Basophils %  0    %


 


Neutrophils #  5.5    (1.3-7.7)  k/uL


 


Lymphocytes #  0.3 L    (1.0-4.8)  k/uL


 


Monocytes #  0.2    (0-1.0)  k/uL


 


Eosinophils #  0.0    (0-0.7)  k/uL


 


Basophils #  0.0    (0-0.2)  k/uL


 


Hypochromasia  Moderate    


 


Anisocytosis  Slight    


 


PT   11.6   (9.0-12.0)  sec


 


INR   1.1   (<1.2)  


 


APTT   18.0 L   (22.0-30.0)  sec


 


Sodium    143  (137-145)  mmol/L


 


Potassium    4.7  (3.5-5.1)  mmol/L


 


Chloride    109 H  ()  mmol/L


 


Carbon Dioxide    28  (22-30)  mmol/L


 


Anion Gap    6  mmol/L


 


BUN    43 H  (9-20)  mg/dL


 


Creatinine    0.68  (0.66-1.25)  mg/dL


 


Est GFR (CKD-EPI)AfAm    >90  (>60 ml/min/1.73 sqM)  


 


Est GFR (CKD-EPI)NonAf    89  (>60 ml/min/1.73 sqM)  


 


Glucose    480 H  (74-99)  mg/dL


 


Plasma Lactic Acid Braden     (0.7-2.0)  mmol/L


 


Calcium    8.9  (8.4-10.2)  mg/dL


 


Magnesium    2.1  (1.6-2.3)  mg/dL


 


Total Bilirubin    0.5  (0.2-1.3)  mg/dL


 


AST    22  (17-59)  U/L


 


ALT    18  (4-49)  U/L


 


Alkaline Phosphatase    134 H  ()  U/L


 


Troponin I     (0.000-0.034)  ng/mL


 


NT-Pro-B Natriuret Pep     pg/mL


 


Total Protein    5.4 L  (6.3-8.2)  g/dL


 


Albumin    2.3 L  (3.5-5.0)  g/dL


 


Urine Color     


 


Urine Appearance     (Clear)  


 


Urine pH     (5.0-8.0)  


 


Ur Specific Gravity     (1.001-1.035)  


 


Urine Protein     (Negative)  


 


Urine Glucose (UA)     (Negative)  


 


Urine Ketones     (Negative)  


 


Urine Blood     (Negative)  


 


Urine Nitrite     (Negative)  


 


Urine Bilirubin     (Negative)  


 


Urine Urobilinogen     (<2.0)  mg/dL


 


Ur Leukocyte Esterase     (Negative)  


 


Urine RBC     (0-5)  /hpf


 


Urine WBC     (0-5)  /hpf


 


Urine WBC Clumps     (None)  /hpf


 


Ur Squamous Epith Cells     (0-4)  /hpf


 


Urine Bacteria     (None)  /hpf


 


Hyaline Casts     (0-2)  /lpf


 


Urine Mucus     (None)  /hpf


 


Urine Yeast (Budding)     (None)  /hpf














  09/09/21 09/09/21 09/09/21 Range/Units





  11:53 11:53 11:53 


 


WBC     (3.8-10.6)  k/uL


 


RBC     (4.30-5.90)  m/uL


 


Hgb     (13.0-17.5)  gm/dL


 


Hct     (39.0-53.0)  %


 


MCV     (80.0-100.0)  fL


 


MCH     (25.0-35.0)  pg


 


MCHC     (31.0-37.0)  g/dL


 


RDW     (11.5-15.5)  %


 


Plt Count     (150-450)  k/uL


 


MPV     


 


Neutrophils %     %


 


Lymphocytes %     %


 


Monocytes %     %


 


Eosinophils %     %


 


Basophils %     %


 


Neutrophils #     (1.3-7.7)  k/uL


 


Lymphocytes #     (1.0-4.8)  k/uL


 


Monocytes #     (0-1.0)  k/uL


 


Eosinophils #     (0-0.7)  k/uL


 


Basophils #     (0-0.2)  k/uL


 


Hypochromasia     


 


Anisocytosis     


 


PT     (9.0-12.0)  sec


 


INR     (<1.2)  


 


APTT     (22.0-30.0)  sec


 


Sodium     (137-145)  mmol/L


 


Potassium     (3.5-5.1)  mmol/L


 


Chloride     ()  mmol/L


 


Carbon Dioxide     (22-30)  mmol/L


 


Anion Gap     mmol/L


 


BUN     (9-20)  mg/dL


 


Creatinine     (0.66-1.25)  mg/dL


 


Est GFR (CKD-EPI)AfAm     (>60 ml/min/1.73 sqM)  


 


Est GFR (CKD-EPI)NonAf     (>60 ml/min/1.73 sqM)  


 


Glucose     (74-99)  mg/dL


 


Plasma Lactic Acid Braden  1.8    (0.7-2.0)  mmol/L


 


Calcium     (8.4-10.2)  mg/dL


 


Magnesium     (1.6-2.3)  mg/dL


 


Total Bilirubin     (0.2-1.3)  mg/dL


 


AST     (17-59)  U/L


 


ALT     (4-49)  U/L


 


Alkaline Phosphatase     ()  U/L


 


Troponin I   0.272 H*   (0.000-0.034)  ng/mL


 


NT-Pro-B Natriuret Pep    80481  pg/mL


 


Total Protein     (6.3-8.2)  g/dL


 


Albumin     (3.5-5.0)  g/dL


 


Urine Color     


 


Urine Appearance     (Clear)  


 


Urine pH     (5.0-8.0)  


 


Ur Specific Gravity     (1.001-1.035)  


 


Urine Protein     (Negative)  


 


Urine Glucose (UA)     (Negative)  


 


Urine Ketones     (Negative)  


 


Urine Blood     (Negative)  


 


Urine Nitrite     (Negative)  


 


Urine Bilirubin     (Negative)  


 


Urine Urobilinogen     (<2.0)  mg/dL


 


Ur Leukocyte Esterase     (Negative)  


 


Urine RBC     (0-5)  /hpf


 


Urine WBC     (0-5)  /hpf


 


Urine WBC Clumps     (None)  /hpf


 


Ur Squamous Epith Cells     (0-4)  /hpf


 


Urine Bacteria     (None)  /hpf


 


Hyaline Casts     (0-2)  /lpf


 


Urine Mucus     (None)  /hpf


 


Urine Yeast (Budding)     (None)  /hpf














  09/09/21 Range/Units





  13:00 


 


WBC   (3.8-10.6)  k/uL


 


RBC   (4.30-5.90)  m/uL


 


Hgb   (13.0-17.5)  gm/dL


 


Hct   (39.0-53.0)  %


 


MCV   (80.0-100.0)  fL


 


MCH   (25.0-35.0)  pg


 


MCHC   (31.0-37.0)  g/dL


 


RDW   (11.5-15.5)  %


 


Plt Count   (150-450)  k/uL


 


MPV   


 


Neutrophils %   %


 


Lymphocytes %   %


 


Monocytes %   %


 


Eosinophils %   %


 


Basophils %   %


 


Neutrophils #   (1.3-7.7)  k/uL


 


Lymphocytes #   (1.0-4.8)  k/uL


 


Monocytes #   (0-1.0)  k/uL


 


Eosinophils #   (0-0.7)  k/uL


 


Basophils #   (0-0.2)  k/uL


 


Hypochromasia   


 


Anisocytosis   


 


PT   (9.0-12.0)  sec


 


INR   (<1.2)  


 


APTT   (22.0-30.0)  sec


 


Sodium   (137-145)  mmol/L


 


Potassium   (3.5-5.1)  mmol/L


 


Chloride   ()  mmol/L


 


Carbon Dioxide   (22-30)  mmol/L


 


Anion Gap   mmol/L


 


BUN   (9-20)  mg/dL


 


Creatinine   (0.66-1.25)  mg/dL


 


Est GFR (CKD-EPI)AfAm   (>60 ml/min/1.73 sqM)  


 


Est GFR (CKD-EPI)NonAf   (>60 ml/min/1.73 sqM)  


 


Glucose   (74-99)  mg/dL


 


Plasma Lactic Acid Braden   (0.7-2.0)  mmol/L


 


Calcium   (8.4-10.2)  mg/dL


 


Magnesium   (1.6-2.3)  mg/dL


 


Total Bilirubin   (0.2-1.3)  mg/dL


 


AST   (17-59)  U/L


 


ALT   (4-49)  U/L


 


Alkaline Phosphatase   ()  U/L


 


Troponin I   (0.000-0.034)  ng/mL


 


NT-Pro-B Natriuret Pep   pg/mL


 


Total Protein   (6.3-8.2)  g/dL


 


Albumin   (3.5-5.0)  g/dL


 


Urine Color  Yellow  


 


Urine Appearance  Cloudy  (Clear)  


 


Urine pH  5.0  (5.0-8.0)  


 


Ur Specific Gravity  1.023  (1.001-1.035)  


 


Urine Protein  Negative  (Negative)  


 


Urine Glucose (UA)  4+ H  (Negative)  


 


Urine Ketones  Trace H  (Negative)  


 


Urine Blood  Negative  (Negative)  


 


Urine Nitrite  Negative  (Negative)  


 


Urine Bilirubin  Negative  (Negative)  


 


Urine Urobilinogen  <2.0  (<2.0)  mg/dL


 


Ur Leukocyte Esterase  Large H  (Negative)  


 


Urine RBC  49 H  (0-5)  /hpf


 


Urine WBC  80 H  (0-5)  /hpf


 


Urine WBC Clumps  Moderate H  (None)  /hpf


 


Ur Squamous Epith Cells  1  (0-4)  /hpf


 


Urine Bacteria  Rare H  (None)  /hpf


 


Hyaline Casts  3 H  (0-2)  /lpf


 


Urine Mucus  Rare H  (None)  /hpf


 


Urine Yeast (Budding)  Many H  (None)  /hpf














- Radiology Data


Radiology results: report reviewed (CT brain shows degenerative and nonspecific 

white matter changes of remote white matter ischemia.), image reviewed (Chest 

x-ray concerning for heart failure)





Critical Care Time


Critical Care Time: Yes


Total Critical Care Time: 32





Disposition


Clinical Impression: 


 Systolic congestive heart failure, NSTEMI (non-ST elevated myocardial 

infarction)





Disposition: ADMITTED AS IP TO THIS HOSP


Condition: Serious


Is patient prescribed a controlled substance at d/c from ED?: No


Referrals: 


Jordan Burrell MD [Primary Care Provider] - 1-2 days

## 2021-09-09 NOTE — CT
EXAMINATION TYPE: CT brain wo con

 

DATE OF EXAM: 9/9/2021

 

COMPARISON: 8/12/2021

 

HISTORY: altered mental status

 

CT DLP: 1099.4 mGycm

Automated exposure control for dose reduction was used.

 

FINDINGS: 

There is a moderate to severe degenerative change of the greater central component. No acute hemorrha
ge or mass effect. Diffuse low-attenuation the white matter is nonspecific. Calvarium is intact. Orbi
ts are symmetric. Sinuses are clear. Craniocervical junction maintained. Sella turcica has a normal a
ppearance.. 

 

IMPRESSION: 

DEGENERATIVE AND NONSPECIFIC WHITE MATTER CHANGES OF REMOTE WHITE MATTER ISCHEMIA.

## 2021-09-09 NOTE — XR
EXAMINATION TYPE: XR chest 1V

 

DATE OF EXAM: 9/9/2021

 

COMPARISON: 8/14/2021

 

HISTORY: 80-year-old male shortness of breath, difficulty breathing

 

TECHNIQUE: Single frontal view of the chest is obtained.

 

FINDINGS:  

The heart is mildly enlarged. Perihilar and confined air space opacities especially in the mid and lo
wer lungs, left greater than right. Small bilateral pleural effusions.

 

IMPRESSION:  

Correlate for yolanda pulmonary edema.

## 2021-09-10 ENCOUNTER — HOSPITAL ENCOUNTER (INPATIENT)
Dept: HOSPITAL 47 - 3SCARD | Age: 81
LOS: 5 days | DRG: 951 | End: 2021-09-15
Payer: MEDICAID

## 2021-09-10 VITALS
RESPIRATION RATE: 12 BRPM | SYSTOLIC BLOOD PRESSURE: 92 MMHG | TEMPERATURE: 97.4 F | DIASTOLIC BLOOD PRESSURE: 56 MMHG | HEART RATE: 101 BPM

## 2021-09-10 VITALS — BODY MASS INDEX: 20.3 KG/M2

## 2021-09-10 DIAGNOSIS — E11.65: ICD-10-CM

## 2021-09-10 DIAGNOSIS — L89.152: ICD-10-CM

## 2021-09-10 DIAGNOSIS — Z88.1: ICD-10-CM

## 2021-09-10 DIAGNOSIS — L89.612: ICD-10-CM

## 2021-09-10 DIAGNOSIS — Z87.01: ICD-10-CM

## 2021-09-10 DIAGNOSIS — J81.0: ICD-10-CM

## 2021-09-10 DIAGNOSIS — I48.0: ICD-10-CM

## 2021-09-10 DIAGNOSIS — R79.89: ICD-10-CM

## 2021-09-10 DIAGNOSIS — I48.92: ICD-10-CM

## 2021-09-10 DIAGNOSIS — D61.818: ICD-10-CM

## 2021-09-10 DIAGNOSIS — Z51.5: Primary | ICD-10-CM

## 2021-09-10 DIAGNOSIS — I50.9: ICD-10-CM

## 2021-09-10 DIAGNOSIS — F03.90: ICD-10-CM

## 2021-09-10 DIAGNOSIS — I42.9: ICD-10-CM

## 2021-09-10 DIAGNOSIS — Z79.82: ICD-10-CM

## 2021-09-10 LAB
ANION GAP SERPL CALC-SCNC: 3 MMOL/L
BASOPHILS # BLD AUTO: 0 K/UL (ref 0–0.2)
BASOPHILS NFR BLD AUTO: 0 %
BUN SERPL-SCNC: 38 MG/DL (ref 9–20)
CALCIUM SPEC-MCNC: 8.7 MG/DL (ref 8.4–10.2)
CHLORIDE SERPL-SCNC: 111 MMOL/L (ref 98–107)
CHOLEST SERPL-MCNC: 104 MG/DL (ref 0–200)
CO2 SERPL-SCNC: 32 MMOL/L (ref 22–30)
EOSINOPHIL # BLD AUTO: 0.1 K/UL (ref 0–0.7)
EOSINOPHIL NFR BLD AUTO: 2 %
ERYTHROCYTE [DISTWIDTH] IN BLOOD BY AUTOMATED COUNT: 3.29 M/UL (ref 4.3–5.9)
ERYTHROCYTE [DISTWIDTH] IN BLOOD: 17.3 % (ref 11.5–15.5)
GLUCOSE BLD-MCNC: 117 MG/DL (ref 75–99)
GLUCOSE BLD-MCNC: 126 MG/DL (ref 75–99)
GLUCOSE BLD-MCNC: 66 MG/DL (ref 75–99)
GLUCOSE SERPL-MCNC: 63 MG/DL (ref 74–99)
HBA1C MFR BLD: 9.4 % (ref 4–6)
HCT VFR BLD AUTO: 30.7 % (ref 39–53)
HDLC SERPL-MCNC: 54 MG/DL (ref 40–60)
HGB BLD-MCNC: 9.9 GM/DL (ref 13–17.5)
LDLC SERPL CALC-MCNC: 39.6 MG/DL (ref 0–131)
LYMPHOCYTES # SPEC AUTO: 0.3 K/UL (ref 1–4.8)
LYMPHOCYTES NFR SPEC AUTO: 10 %
MCH RBC QN AUTO: 29.9 PG (ref 25–35)
MCHC RBC AUTO-ENTMCNC: 32.1 G/DL (ref 31–37)
MCV RBC AUTO: 93.2 FL (ref 80–100)
MONOCYTES # BLD AUTO: 0.2 K/UL (ref 0–1)
MONOCYTES NFR BLD AUTO: 5 %
NEUTROPHILS # BLD AUTO: 2.6 K/UL (ref 1.3–7.7)
NEUTROPHILS NFR BLD AUTO: 82 %
PLATELET # BLD AUTO: 134 K/UL (ref 150–450)
POTASSIUM SERPL-SCNC: 3.2 MMOL/L (ref 3.5–5.1)
SODIUM SERPL-SCNC: 146 MMOL/L (ref 137–145)
TRIGL SERPL-MCNC: 52 MG/DL (ref 0–149)
VLDLC SERPL CALC-MCNC: 10.4 MG/DL (ref 5–40)
WBC # BLD AUTO: 3.2 K/UL (ref 3.8–10.6)

## 2021-09-10 PROCEDURE — 94760 N-INVAS EAR/PLS OXIMETRY 1: CPT

## 2021-09-10 RX ADMIN — NITROGLYCERIN SCH INCH: 20 OINTMENT TOPICAL at 09:11

## 2021-09-10 RX ADMIN — INSULIN ASPART SCH: 100 INJECTION, SOLUTION INTRAVENOUS; SUBCUTANEOUS at 06:26

## 2021-09-10 RX ADMIN — SCOPALAMINE SCH PATCH: 1 PATCH, EXTENDED RELEASE TRANSDERMAL at 15:53

## 2021-09-10 RX ADMIN — FUROSEMIDE SCH MG: 10 INJECTION, SOLUTION INTRAMUSCULAR; INTRAVENOUS at 09:12

## 2021-09-10 RX ADMIN — NITROGLYCERIN SCH: 20 OINTMENT TOPICAL at 12:14

## 2021-09-10 RX ADMIN — INSULIN ASPART SCH: 100 INJECTION, SOLUTION INTRAVENOUS; SUBCUTANEOUS at 12:13

## 2021-09-10 NOTE — P.CRDCN
History of Present Illness


History of present illness: 


HISTORY OF PRESENTING ILLNESS


This is an 80-year-old gentleman with a past medical history of dementia, 

hyperlipidemia, hypertension and type 2 diabetes.  Patient presents emergency 

department for mental large due to altered mental status and shortness of 

breath.  Patient is at a nursing facility currently on hospice care. Patient 

seen and examined at bedside, no acute distress, breathing is unlabored and 

regular. Patient is not responding to verbal stumli. Patient recently presented 

to the hospital [facility] 8/13/21 with altered mental status, and 

state of unresponsiveness.  Patient was found to have metabolic encephalopathy. 

He also had positive blood cultures, which was thought to be contaminated and ID

was following for antibiotic management. He was found to have new onset atrial 

flutter. Cardiology did see the patient at that time. He did have elevated 

troponins which came back at 0.132 and 0.131.  NT proBNP was also elevated at 

23,000 above the patient did not appear to be in failure at that time.  EKG on 

previous admission revealed atrial tachycardia with 2:1 conduction.  Patient's 

telemetry at that time appeared to be in atrial flutter with 2-1 conduction, 

although it was unclear. He was started on amiodarone. Echocardiogram was 

performed, poor quality unable to calculate EF due to tachycardia.  





DIAGNOSTICS


EKG reveals atrial tachycardia tachycardia, heart rate 110. Prior EKG patient 

also in atrial tachcardia 


Telemetry tracings patient appears to be in atrial flutter HR 


Chest xray mid and lower lung opacities left > R 


Laboratory reviewed, WBC 3.2, hemoglobin 9.9, platelets 134, sodium 146, 

potassium 3.2, BUN 38, serum creatinine 0.6, troponin 0.22, Blood sugars 480-- 

dropped to 63, BNP 25,300 previously 23,000. 


Current outpatient cardiac medications include amiodarone taper. 





REVIEW OF SYSTEMS


At the time of my exam: Unable to get an accurate review of symptoms due to 

mental status 





PHYSICAL EXAMINATION


CONSTITUTIONAL: No apparent distress. 


HEENT: Head is normocephalic. Pupils are equal, round. Mucous membranes of the 

mouth are dry. No JVD.


CHEST EXAMINATION: Lungs are diminished to auscultation.Patient not taking a 

deep breath on exam 


HEART EXAMINATION: Regular rate and rhythm. S1, S2 heard. No murmurs, gallops or

rub.


ABDOMEN: Soft, nontender. Positive bowel sounds.


EXTREMITIES: 2+ peripheral pulses, 4+ bilateral lower extremity edema bilateral 

legs. +2 bilateral upper extremity edema and no calf tenderness.


SKIN: ecchymosis bilateral arms. 


NEUROLOGIC EXAMINATION: Patient is drowsy. Does open his eyes





ASSESSMENT


Atrial flutter of unknown duration 


Hypokalemia


Hypernatremia


History of Hypertension- hypotensive on admission 


Dementia


Hyperlipidemia


Diabetes mellitus type 2 - blood sugars uncontrolled


Elevated troponins not consistent with acute coronary event


Cardiomyopathy, unknown type. Likely tachycardia induced





Plan


Restart patient's home amiodarone 200mg daily 


Lopressor 25mg BID, hold for systolic bp less than 90.  


Consider comfort care measures. 


Rest of management per primary 


We will follow the patient as needed. Please reconsult if needed. 


Thank you kindly for this consultation. 





Nurse Practitioner note has been reviewed, I agree with a documented findings 

and plan of care.  Patient was seen and examined.











Past Medical History


Past Medical History: Atrial Fibrillation, Heart Failure, Dementia, Diabetes 

Mellitus


History of Any Multi-Drug Resistant Organisms: None Reported


Past Surgical History: No Surgical Hx Reported


Past Anesthesia/Blood Transfusion Reactions: No Reported Reaction


Past Psychological History: No Psychological Hx Reported


Smoking Status: Unknown if ever smoked


Past Alcohol Use History: None Reported


Past Drug Use History: None Reported





- Past Family History


  ** Father


Family Medical History: Unable to Obtain





  ** Mother


Family Medical History: Unable to Obtain





Medications and Allergies


                                Home Medications











 Medication  Instructions  Recorded  Confirmed  Type


 


Amiodarone [Cordarone] See Taper PO BID 09/09/21 09/09/21 History


 


Donepezil [Aricept] 10 mg PO HS@2000 09/09/21 09/09/21 History


 


Glucagon Emergency Kit 1 mg IM ONCE 09/09/21 09/09/21 History


 


Healthshake 1 dose PO TID-W/MEALS 09/09/21 09/09/21 History


 


Hyoscyamine Elixir [Levsin 0.125 mg PO Q4H PRN 09/09/21 09/09/21 History





0.125MG/ML Drops]    


 


Omeprazole Magnesium [PriLOSEC] 20 mg PO DAILY 09/09/21 09/09/21 History


 


Prostat Awc 1 dose PO BID@0700,1600 09/09/21 09/09/21 History








                                    Allergies











Allergy/AdvReac Type Severity Reaction Status Date / Time


 


amoxicillin Allergy  Unknown Verified 09/09/21 13:30














Physical Exam


Vitals: 


                                   Vital Signs











  Temp Pulse Pulse Resp BP BP Pulse Ox


 


 09/10/21 04:00  98.5 F   112 H  18   89/57  99


 


 09/10/21 02:00    116 H  18   


 


 09/10/21 00:00    116 H  18   


 


 09/09/21 22:10  98.4 F   116 H  18   111/59  93 L


 


 09/09/21 20:39   113 H   15  100/80   98


 


 09/09/21 15:44   110 H    110/74   100


 


 09/09/21 13:19   110 H   18  107/65   98


 


 09/09/21 11:03  98 F  110 H   14  116/87   97








                                Intake and Output











 09/09/21 09/10/21 09/10/21





 22:59 06:59 14:59


 


Intake Total  72.972 


 


Output Total  975 


 


Balance  -902.028 


 


Intake:   


 


  Intake, IV Titration  72.972 





  Amount   


 


    Heparin Sod,Pork in 0.45%  72.972 





    NaCl 25,000 unit In 0.45   





    % NaCl 1 250ml.bag @ 12   





    UNITS/KG/HR 8.899 mls/hr   





    IV .Q24H [facility] Rx#:   





    [id number]   


 


Output:   


 


  Urine  975 


 


Other:   


 


  Voiding Method  Indwelling Catheter 


 


  # Bowel Movements  2 


 


  Weight 74.162 kg 68 kg 














Results





                                 09/10/21 08:58





                                 09/10/21 08:58


                                 Cardiac Enzymes











  09/09/21 09/09/21 09/09/21 Range/Units





  11:53 11:53 22:01 


 


AST  22    (17-59)  U/L


 


Troponin I   0.272 H*  0.245 H*  (0.000-0.034)  ng/mL








                                   Coagulation











  09/09/21 09/09/21 09/09/21 Range/Units





  11:53 18:34 22:01 


 


PT  11.6    (9.0-12.0)  sec


 


APTT  18.0 L  27.4  51.9 H  (22.0-30.0)  sec








                                       CBC











  09/09/21 Range/Units





  11:53 


 


WBC  6.1  (3.8-10.6)  k/uL


 


RBC  3.69 L  (4.30-5.90)  m/uL


 


Hgb  11.2 L  (13.0-17.5)  gm/dL


 


Hct  35.1 L  (39.0-53.0)  %


 


Plt Count  139 L  (150-450)  k/uL








                          Comprehensive Metabolic Panel











  09/09/21 Range/Units





  11:53 


 


Sodium  143  (137-145)  mmol/L


 


Potassium  4.7  (3.5-5.1)  mmol/L


 


Chloride  109 H  ()  mmol/L


 


Carbon Dioxide  28  (22-30)  mmol/L


 


BUN  43 H  (9-20)  mg/dL


 


Creatinine  0.68  (0.66-1.25)  mg/dL


 


Glucose  480 H  (74-99)  mg/dL


 


Calcium  8.9  (8.4-10.2)  mg/dL


 


AST  22  (17-59)  U/L


 


ALT  18  (4-49)  U/L


 


Alkaline Phosphatase  134 H  ()  U/L


 


Total Protein  5.4 L  (6.3-8.2)  g/dL


 


Albumin  2.3 L  (3.5-5.0)  g/dL








                               Current Medications











Generic Name Dose Route Start Last Admin





  Trade Name Freq  PRN Reason Stop Dose Admin


 


Aspirin  325 mg  09/10/21 09:00 





  Aspirin 325 Mg Tab  PO  





  DAILY [facility]  


 


Furosemide  20 mg  09/10/21 00:00  09/09/21 23:29





  Furosemide 10 Mg/Ml 2 Ml Vial  IV   20 mg





  Q8HR YUE   Administration


 


Heparin Sodium/Sodium Chloride  250 mls @ 8.899 mls/hr  09/09/21 15:45  09/10/21

 00:16





  25,000 unit/ Sodium Chloride  IV   12 units/kg/hr





  .Q24H YUE   8.899 mls/hr





    Titration





  Protocol  





  12 UNITS/KG/HR  


 


Insulin Aspart  0 unit  09/09/21 17:30  09/10/21 06:26





  Insulin Aspart (Novolog) 100 Unit/Ml Vial  SQ   Not Given





  ACHS [facility]  





  Protocol  


 


Insulin Detemir  10 unit  09/09/21 23:45  09/10/21 00:23





  Insulin Detemir (Levemir) 100 Unit/Ml Syr  SQ   10 unit





  HS YUE   Administration


 


Metoprolol Tartrate  25 mg  09/10/21 09:00 





  Metoprolol Tartrate 25 Mg Tab  PO  





  TID YUE  


 


Nitroglycerin  0.4 mg  09/09/21 15:35 





  Nitroglycerin Sl Tabs 0.4 Mg Tab  SUBLINGUAL  





  Q5M PRN  





  Chest Pain  


 


Nitroglycerin  1 inch  09/09/21 18:00  09/09/21 21:58





  Nitroglycerin Oint 1 Inch/Gm Packet  TOPICAL   Not Given





  QID [facility]  


 


Pantoprazole Sodium  40 mg  09/10/21 07:30  09/10/21 06:27





  Pantoprazole 40 Mg Tablet  PO   Not Given





  -BRKT [facility]  








                                Intake and Output











 09/09/21 09/10/21 09/10/21





 22:59 06:59 14:59


 


Intake Total  72.972 


 


Output Total  975 


 


Balance  -902.028 


 


Intake:   


 


  Intake, IV Titration  72.972 





  Amount   


 


    Heparin Sod,Pork in 0.45%  72.972 





    NaCl 25,000 unit In 0.45   





    % NaCl 1 250ml.bag @ 12   





    UNITS/KG/HR 8.899 mls/hr   





    IV .Q24H [facility] Rx#:   





    [id number]   


 


Output:   


 


  Urine  975 


 


Other:   


 


  Voiding Method  Indwelling Catheter 


 


  # Bowel Movements  2 


 


  Weight 74.162 kg 68 kg 








                                        





                                 09/09/21 11:53 





                                 09/09/21 11:53

## 2021-09-10 NOTE — P.HPIM
History of Present Illness





This is a pleasant 80 years old male with multiple medical problems including 

dementia, paroxysmal atrial fibrillation/atrial flutter.  Cardiomyopathy, 

elevated troponin which is chronic, diabetes mellitus with hyperglycemia, sacral

pressure ulcer and right heel pressure ulcer stage II. He was sent from 

[facility]. respitory signs and symptoms ating 99% on 4 L oxygen, afebrile. 




Patient cannot provide information and it was obtained from staff, recurrence 

and wife.  He is awake and alert and looks at me when I talked to him but he 

does not seem to understand.  He does not follow command.  He couldn't tell me 

even his name.  He looks calm not in distress.  He is mildly tachypneic.


I called the wife and daughter over the phone, she wasn't sure if he asked for 

food or to be cleaned, it looks like he does not talk much.


I discussed the case with the wife and his prognosis and she agrees for him to 

be no code, he has no code order in the computer also she is open to talk to 

palliative consult for possible palliative care versus hospice but she was 

undecided if this going to be inpatient or outpatient


Patient is tachycardic with heart rate 112-116.  Blood pressure is borderline 

89/57


CBC is unremarkable except for mild anemia with hemoglobin 11.2 and platelets 

139.  INR is 1.1


Glucose is elevated on admission 418 and 431.  BMP and liver enzymes are 

unremarkable.  Troponin are elevated 0.2 which is chronic.  ProBNP is elevated 

to 25,300


EKG showing low voltage, sinus tachycardia at 110 with QTC of 400 with no 

significant ST-T changes.


Chest x-ray showing yolanda pulmonary edema


CT of the brain showing degenerative changes


With the emergency room received a fluid bolus of 250 mm and started on 100 mL 

per hour which is stopped now.  He was also started on heparin drip and aspirin


He is currently on IV Lasix 20 mg every 8 hours











Review of Systems





n/a





Past Medical History


Past Medical History: Atrial Fibrillation, Heart Failure, Dementia, Diabetes 

Mellitus


History of Any Multi-Drug Resistant Organisms: None Reported


Past Surgical History: No Surgical Hx Reported


Past Anesthesia/Blood Transfusion Reactions: No Reported Reaction


Past Psychological History: No Psychological Hx Reported


Smoking Status: Unknown if ever smoked


Past Alcohol Use History: None Reported


Past Drug Use History: None Reported





- Past Family History


  ** Father


Family Medical History: Unable to Obtain





  ** Mother


Family Medical History: Unable to Obtain





Medications and Allergies


                                Home Medications











 Medication  Instructions  Recorded  Confirmed  Type


 


Amiodarone [Cordarone] See Taper PO BID 09/09/21 09/09/21 History


 


Donepezil [Aricept] 10 mg PO HS@2000 09/09/21 09/09/21 History


 


Glucagon Emergency Kit 1 mg IM ONCE 09/09/21 09/09/21 History


 


Healthshake 1 dose PO TID-W/MEALS 09/09/21 09/09/21 History


 


Hyoscyamine Elixir [Levsin 0.125 mg PO Q4H PRN 09/09/21 09/09/21 History





0.125MG/ML Drops]    


 


Omeprazole Magnesium [PriLOSEC] 20 mg PO DAILY 09/09/21 09/09/21 History


 


Prostat Awc 1 dose PO BID@0700,1600 09/09/21 09/09/21 History








                                    Allergies











Allergy/AdvReac Type Severity Reaction Status Date / Time


 


amoxicillin Allergy  Unknown Verified 09/09/21 13:30














Physical Exam


Vitals: 


                                   Vital Signs











  Temp Pulse Pulse Resp BP BP Pulse Ox


 


 09/10/21 04:00  98.5 F   112 H  18   89/57  99


 


 09/10/21 02:00    116 H  18   


 


 09/10/21 00:00    116 H  18   


 


 09/09/21 22:10  98.4 F   116 H  18   111/59  93 L


 


 09/09/21 20:39   113 H   15  100/80   98


 


 09/09/21 15:44   110 H    110/74   100


 


 09/09/21 13:19   110 H   18  107/65   98


 


 09/09/21 11:03  98 F  110 H   14  116/87   97








                                Intake and Output











 09/09/21 09/10/21 09/10/21





 22:59 06:59 14:59


 


Intake Total  72.972 


 


Output Total  975 


 


Balance  -902.028 


 


Intake:   


 


  Intake, IV Titration  72.972 





  Amount   


 


    Heparin Sod,Pork in 0.45%  72.972 





    NaCl 25,000 unit In 0.45   





    % NaCl 1 250ml.bag @ 12   





    UNITS/KG/HR 8.899 mls/hr   





    IV .Q24H [facility] Rx#:   





    [id number]   


 


Output:   


 


  Urine  975 


 


Other:   


 


  Voiding Method  Indwelling Catheter 


 


  # Bowel Movements  2 


 


  Weight 74.162 kg 68 kg 














-GENERAL: The patient is awake, stairs at me when I talked to him, does not 

follow command, cannot tell me his name, not in any acute distress. Well 

developed, well nourished. 


HEENT: Pupils are round and equally reacting to light. EOMI. No scleral icterus.

 No conjunctival pallor. Normocephalic, atraumatic. No pharyngeal erythema. No 

thyromegaly. 


CARDIOVASCULAR: S1 and S2 present. No murmurs, rubs, or gallops. 


-PULMONARY: Chest is clear to auscultation, no wheezing. Bilateral crepitation 


ABDOMEN: Soft, nontender, nondistended, normoactive bowel sounds. No palpable 

organomegaly. 


MUSCULOSKELETAL: No joint swelling or deformity. 


-EXTREMITIES: No cyanosis, clubbing,.  Bilateral patellar leg edema


NEUROLOGICAL: Gross neurological examination did not reveal any focal deficits. 


SKIN: No rashes. No petechiae








Results


CBC & Chem 7: 


                                 09/10/21 08:58





                                 09/10/21 08:58


Labs: 


                  Abnormal Lab Results - Last 24 Hours (Table)











  09/09/21 09/09/21 09/09/21 Range/Units





  11:53 11:53 11:53 


 


RBC  3.69 L    (4.30-5.90)  m/uL


 


Hgb  11.2 L    (13.0-17.5)  gm/dL


 


Hct  35.1 L    (39.0-53.0)  %


 


RDW  17.4 H    (11.5-15.5)  %


 


Plt Count  139 L    (150-450)  k/uL


 


Lymphocytes #  0.3 L    (1.0-4.8)  k/uL


 


APTT   18.0 L   (22.0-30.0)  sec


 


Chloride    109 H  ()  mmol/L


 


BUN    43 H  (9-20)  mg/dL


 


Glucose    480 H  (74-99)  mg/dL


 


POC Glucose (mg/dL)     (75-99)  mg/dL


 


Alkaline Phosphatase    134 H  ()  U/L


 


Troponin I     (0.000-0.034)  ng/mL


 


Total Protein    5.4 L  (6.3-8.2)  g/dL


 


Albumin    2.3 L  (3.5-5.0)  g/dL


 


Urine Glucose (UA)     (Negative)  


 


Urine Ketones     (Negative)  


 


Ur Leukocyte Esterase     (Negative)  


 


Urine RBC     (0-5)  /hpf


 


Urine WBC     (0-5)  /hpf


 


Urine WBC Clumps     (None)  /hpf


 


Urine Bacteria     (None)  /hpf


 


Hyaline Casts     (0-2)  /lpf


 


Urine Mucus     (None)  /hpf


 


Urine Yeast (Budding)     (None)  /hpf














  09/09/21 09/09/21 09/09/21 Range/Units





  11:53 13:00 19:42 


 


RBC     (4.30-5.90)  m/uL


 


Hgb     (13.0-17.5)  gm/dL


 


Hct     (39.0-53.0)  %


 


RDW     (11.5-15.5)  %


 


Plt Count     (150-450)  k/uL


 


Lymphocytes #     (1.0-4.8)  k/uL


 


APTT     (22.0-30.0)  sec


 


Chloride     ()  mmol/L


 


BUN     (9-20)  mg/dL


 


Glucose     (74-99)  mg/dL


 


POC Glucose (mg/dL)    431 H  (75-99)  mg/dL


 


Alkaline Phosphatase     ()  U/L


 


Troponin I  0.272 H*    (0.000-0.034)  ng/mL


 


Total Protein     (6.3-8.2)  g/dL


 


Albumin     (3.5-5.0)  g/dL


 


Urine Glucose (UA)   4+ H   (Negative)  


 


Urine Ketones   Trace H   (Negative)  


 


Ur Leukocyte Esterase   Large H   (Negative)  


 


Urine RBC   49 H   (0-5)  /hpf


 


Urine WBC   80 H   (0-5)  /hpf


 


Urine WBC Clumps   Moderate H   (None)  /hpf


 


Urine Bacteria   Rare H   (None)  /hpf


 


Hyaline Casts   3 H   (0-2)  /lpf


 


Urine Mucus   Rare H   (None)  /hpf


 


Urine Yeast (Budding)   Many H   (None)  /hpf














  09/09/21 09/09/21 09/09/21 Range/Units





  22:01 22:01 22:53 


 


RBC     (4.30-5.90)  m/uL


 


Hgb     (13.0-17.5)  gm/dL


 


Hct     (39.0-53.0)  %


 


RDW     (11.5-15.5)  %


 


Plt Count     (150-450)  k/uL


 


Lymphocytes #     (1.0-4.8)  k/uL


 


APTT   51.9 H   (22.0-30.0)  sec


 


Chloride     ()  mmol/L


 


BUN     (9-20)  mg/dL


 


Glucose     (74-99)  mg/dL


 


POC Glucose (mg/dL)    329 H  (75-99)  mg/dL


 


Alkaline Phosphatase     ()  U/L


 


Troponin I  0.245 H*    (0.000-0.034)  ng/mL


 


Total Protein     (6.3-8.2)  g/dL


 


Albumin     (3.5-5.0)  g/dL


 


Urine Glucose (UA)     (Negative)  


 


Urine Ketones     (Negative)  


 


Ur Leukocyte Esterase     (Negative)  


 


Urine RBC     (0-5)  /hpf


 


Urine WBC     (0-5)  /hpf


 


Urine WBC Clumps     (None)  /hpf


 


Urine Bacteria     (None)  /hpf


 


Hyaline Casts     (0-2)  /lpf


 


Urine Mucus     (None)  /hpf


 


Urine Yeast (Budding)     (None)  /hpf














  09/10/21 Range/Units





  06:15 


 


RBC   (4.30-5.90)  m/uL


 


Hgb   (13.0-17.5)  gm/dL


 


Hct   (39.0-53.0)  %


 


RDW   (11.5-15.5)  %


 


Plt Count   (150-450)  k/uL


 


Lymphocytes #   (1.0-4.8)  k/uL


 


APTT   (22.0-30.0)  sec


 


Chloride   ()  mmol/L


 


BUN   (9-20)  mg/dL


 


Glucose   (74-99)  mg/dL


 


POC Glucose (mg/dL)  126 H  (75-99)  mg/dL


 


Alkaline Phosphatase   ()  U/L


 


Troponin I   (0.000-0.034)  ng/mL


 


Total Protein   (6.3-8.2)  g/dL


 


Albumin   (3.5-5.0)  g/dL


 


Urine Glucose (UA)   (Negative)  


 


Urine Ketones   (Negative)  


 


Ur Leukocyte Esterase   (Negative)  


 


Urine RBC   (0-5)  /hpf


 


Urine WBC   (0-5)  /hpf


 


Urine WBC Clumps   (None)  /hpf


 


Urine Bacteria   (None)  /hpf


 


Hyaline Casts   (0-2)  /lpf


 


Urine Mucus   (None)  /hpf


 


Urine Yeast (Budding)   (None)  /hpf








                      Microbiology - Last 24 Hours (Table)











 09/09/21 13:00 Urine Culture - Preliminary





 Urine,Voided 














Thrombosis Risk Factor Assmnt





- Choose All That Apply


Any of the Below Risk Factors Present?: Yes


Each Factor Represents 1 point: Heart failure (<1month)


Each Risk Factor Represents 3 Points: Age 75 years or older


Thrombosis Risk Factor Assessment Total Risk Factor Score: 4


Thrombosis Risk Factor Assessment Level: Moderate Risk





Assessment and Plan


Assessment: 





Mild acute pulmonary edema


Advanced dementia


History of cardiomyopathy, unknown type per cardiologist.  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 

induced


atrial flutter of unknown duration.  Rate controlled and he is on aspirin


Chronically elevated troponin, not consistent with acute coronary event


Recent history of Possible pneumonia.  he  2 L/m with oxygen saturation of 99%


Diabetes mellitus with hyperglycemia


Sacral pressure ulcer, stage II


Right heel pressure ulcer, stage II


Mild pancytopenia











Plan: 





This is a pleasant 80 years old male who presents with pulmonary edema, CHF and 

metabolic encephalopathy on the top of his advanced dementia


Cardiology consult 


continue with Lasix


Palliative consult,  consults 


Labs and medication were reviewed..  Continue same treatment.  Continue with 

symptomatic treatment.  Resume home medication.  Monitor lytes and vitals.  DVT 

and GI prophylaxis.  Further recommendations depends on the clinical course of 

the patient


DVT prophylaxis: Subcutaneous heparin


GI Prophylaxis: Pepcid


PT/OT: Pending


Prognosis is guarded 


we I discussed with wife and she confirms no code

## 2021-09-11 NOTE — P.HPIM
History of Present Illness








This is a pleasant 80 years old male with multiple medical problems including 

advanced dementia, paroxysmal atrial fibrillation/atrial flutter.  

Cardiomyopathy, elevated troponin which is chronic, diabetes mellitus with 

hyperglycemia, sacral pressure ulcer and right heel pressure ulcer stage II. He 

was sent from [facility]. respitory signs and symptoms ating 99% on 4 L 

oxygen, afebrile.  


Patient cannot provide information and it was obtained from staff, recurrence 

and wife.  He is awake and alert and looks at me when I talked to him but he 

does not seem to understand.  He does not follow command.  He couldn't tell me 

even his name.  He looks calm not in distress.  I discussed the case with the 

wife patient does not ask for food, does not asked to be cleaned, does not talk 

much about does not follow command.  Does not tell his name.  It looks like he 

has end-stage dementia, wife talked to hospice team and should signed papers for

him, patient currently Under hospice care


Patient looks comfortable in bed








Review of Systems





n/a





Past Medical History


Past Medical History: Atrial Fibrillation, Heart Failure, Dementia, Diabetes 

Mellitus


History of Any Multi-Drug Resistant Organisms: None Reported


Past Surgical History: No Surgical Hx Reported


Past Anesthesia/Blood Transfusion Reactions: No Reported Reaction


Past Psychological History: No Psychological Hx Reported


Smoking Status: Unknown if ever smoked


Past Alcohol Use History: None Reported


Past Drug Use History: None Reported





- Past Family History


  ** Father


Family Medical History: Unable to Obtain





  ** Mother


Family Medical History: Unable to Obtain





Medications and Allergies


                                Home Medications











 Medication  Instructions  Recorded  Confirmed  Type


 


Amiodarone [Cordarone] See Taper PO BID 09/09/21 09/10/21 History


 


Donepezil [Aricept] 10 mg PO HS@2000 09/09/21 09/10/21 History


 


Glucagon Emergency Kit 1 mg IM ONCE 09/09/21 09/10/21 History


 


Healthshake 1 dose PO TID-W/MEALS 09/09/21 09/10/21 History


 


Hyoscyamine Elixir [Levsin 0.125 mg PO Q4H PRN 09/09/21 09/10/21 History





0.125MG/ML Drops]    


 


Omeprazole Magnesium [PriLOSEC] 20 mg PO DAILY 09/09/21 09/10/21 History


 


Prostat Awc 1 dose PO BID@0700,1600 09/09/21 09/10/21 History








                                    Allergies











Allergy/AdvReac Type Severity Reaction Status Date / Time


 


amoxicillin Allergy  Unknown Verified 09/10/21 14:03














Physical Exam


Vitals: 


                                   Vital Signs











  Temp Pulse Resp BP Pulse Ox


 


 09/11/21 07:48      98


 


 09/11/21 07:47   115 H  14   99


 


 09/11/21 01:06   100  12  


 


 09/10/21 21:08      91 L


 


 09/10/21 20:00    12  


 


 09/10/21 15:57   105 H  12   98


 


 09/10/21 14:41  97.9 F  114 H  17  116/57  99








                                Intake and Output











 09/10/21 09/11/21 09/11/21





 22:59 06:59 14:59


 


Output Total 300 400 


 


Balance -300 -400 


 


Output:   


 


  Urine 300 400 


 


Other:   


 


  Voiding Method Indwelling Catheter  Indwelling Catheter


 


  Weight 68 kg  














Assessment and Plan


Assessment: 





Diagnoses:


Mild acute pulmonary edema


Advanced dementia/end-stage dementia, eligible for hospice care hospital course


History of cardiomyopathy, unknown type per cardiologist.  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 

induced


atrial flutter of unknown duration.  Rate controlled and he is on aspirin


Chronically elevated troponin, not consistent with acute coronary event


Recent history of Possible pneumonia.  he  2 L/m with oxygen saturation of 99%


Diabetes mellitus with hyperglycemia


Sacral pressure ulcer, stage II


Right heel pressure ulcer, stage II


Mild pancytopenia


Plan: 





Continue with hospice care.


Pain medication as needed

## 2021-09-13 VITALS — HEART RATE: 114 BPM | SYSTOLIC BLOOD PRESSURE: 107 MMHG | RESPIRATION RATE: 18 BRPM | DIASTOLIC BLOOD PRESSURE: 73 MMHG

## 2021-09-13 VITALS — TEMPERATURE: 98.5 F

## 2021-09-13 RX ADMIN — SCOPALAMINE SCH PATCH: 1 PATCH, EXTENDED RELEASE TRANSDERMAL at 16:10

## 2021-09-14 NOTE — P.PN
Subjective





This is a pleasant 80 years old male with multiple medical problems including 

advanced dementia, paroxysmal atrial fibrillation/atrial flutter.  Car

diomyopathy, elevated troponin which is chronic, diabetes mellitus with 

hyperglycemia, sacral pressure ulcer and right heel pressure ulcer stage II. He 

was sent from [facility]. respitory signs and symptoms ating 99% on 4 L 

oxygen, afebrile.  


Patient cannot provide information and it was obtained from staff, recurrence 

and wife.  He is awake and alert and looks at me when I talked to him but he 

does not seem to understand.  He does not follow command.  He couldn't tell me 

even his name.  He looks calm not in distress.  I discussed the case with the 

wife patient does not ask for food, does not asked to be cleaned, does not talk 

much about does not follow command.  Does not tell his name.  It looks like he 

has end-stage dementia, wife talked to hospice team and should signed papers for

him, patient currently Under hospice care


Patient looks comfortable in bed





09/12/2021


Today patient is obtunded, unresponsive does not follow command, he has 

bilateral crepitation, he has leg swelling.  Abdomen looks soft.  He remains on 

comfort measures and he looks comfortable.  Patient currently under hospice 

care.  Today wife was at bedside and she had a lot of questions regarding his 

care.  She thinks he needs to eat however patient is obtunded and cannot 

swallow.  I tried to explain to her risk of pushing food in his mouth.She wanted

to give him some oral medication but I explained to her he cannot swallow again 

and switch them to IV will increase his suffering


Also I emphasized for her the idea of hospice care which she decided for 

yesterday and clearly explained to her the concept of treating the symptoms 

rather than treating the illness and the idea is to let him die peacefully in 

his last day of his life.  It was clear for the wife that patient may die any 

minute today or it will take him a few days to weeks.  All her questions were 

answered and I spent more than 35 minutes trying to explain to her his care and 

concept of  Treatment, bedside nurse and charge nurse were also available with 

me and thereafter trying to explain to her the same .


I explained to her that he has advanced/end-stage dementia per neurologist and 

medical team evaluation.  Also comfort care measures suggested by cardiologist 

team and this looks appropriate for him given his advanced and multiple medical 

issues and his advanced age.


By the end of the day she kept him under hospice care which looks appropriate 

and she did not flip him to regular care again (when I checked with the staff R 

on in the evening)


Postoperative also Is a Second Deep OA and He Is Agreeable with Hospice Care As 

Well








9/13/2021


Patient today still unresponsive.  This was at bedside and I discussed the case 

with her with  Suleman together she confirmed to us that she accepted 

the idea his diet and she agrees to continue with hospice care, and she agrees 

to provide pain medicine and whatever make him comfortable that she agrees not 

to feed him anymore while he is obtunded


As I discussed the case with us on Chau who confirms to me that his father has 

been declining over the last 3-4 months he answers with few works at times but 

most of the times is confused.  And he was due to retention quickly and an area 

where to the surrounding and he agrees with him being on hospice as well.





09/14/2021


Patient is obtunded, unresponsive.


He looks comfortable not in pain or any GYN.  Discussed with bed side nurse for 

no need for pain medication for now


as Per my conversation with wife Sulema and family/son Chau yesterday, they 

both in agreement with hospice care








Objective





- Vital Signs


Vital signs: 


                                   Vital Signs











Temp  98.5 F   09/13/21 20:58


 


Pulse  114 H  09/13/21 20:58


 


Resp  18   09/13/21 20:58


 


BP  107/73   09/13/21 20:58


 


Pulse Ox  90 L  09/13/21 20:58








                                 Intake & Output











 09/13/21 09/14/21 09/14/21





 18:59 06:59 18:59


 


Intake Total  0 


 


Output Total  800 


 


Balance  -800 


 


Intake:   


 


  Oral  0 


 


Output:   


 


  Urine  800 


 


    Uretheral (Wahl)  400 


 


Other:   


 


  Voiding Method  Indwelling Catheter Indwelling Catheter














- Exam





GENERAL: The patient is obtunded and unresponsive


HEENT: Pupils are round and equally reacting to light. EOMI. No scleral icterus.

No conjunctival pallor. Normocephalic, atraumatic. No pharyngeal erythema. No 

thyromegaly. 


CARDIOVASCULAR: S1 and S2 present. No murmurs, rubs, or gallops. 


-PULMONARY: Bilateral coarse crepitation. 


ABDOMEN: Soft, nontender, nondistended, normoactive bowel sounds. No palpable 

organomegaly. 


MUSCULOSKELETAL: No joint swelling or deformity. 


-EXTREMITIES: No cyanosis, clubbing, bilateral pitting leg edema


-NEUROLOGICAL: Does not follow commands.  Examination is limited by the patient 

condition


SKIN: No rashes. no petechiae.





Assessment and Plan


Assessment: 





Diagnoses:


acute pulmonary edema


Advanced dementia/end-stage dementia, eligible for hospice care hospital course


History of cardiomyopathy, unknown type per cardiologist. 


atrial flutter of unknown duration.  Rate controlled and he is on aspirin


Chronically elevated troponin, not consistent with acute coronary event


Recent history of Possible pneumonia.  he  2 L/m with oxygen saturation of 99%


Diabetes mellitus with hyperglycemia


Sacral pressure ulcer, stage II


Right heel pressure ulcer, stage II


Mild pancytopenia


Plan: 





Continue with hospice care.


Pain medication as needed


Wife and son both agree with hospice care when I talked to them last time


no code


prognosis is extremely poor
Subjective





This is a pleasant 80 years old male with multiple medical problems including 

advanced dementia, paroxysmal atrial fibrillation/atrial flutter.  Car

diomyopathy, elevated troponin which is chronic, diabetes mellitus with 

hyperglycemia, sacral pressure ulcer and right heel pressure ulcer stage II. He 

was sent from [facility]. respitory signs and symptoms ating 99% on 4 L 

oxygen, afebrile.  


Patient cannot provide information and it was obtained from staff, recurrence 

and wife.  He is awake and alert and looks at me when I talked to him but he 

does not seem to understand.  He does not follow command.  He couldn't tell me 

even his name.  He looks calm not in distress.  I discussed the case with the 

wife patient does not ask for food, does not asked to be cleaned, does not talk 

much about does not follow command.  Does not tell his name.  It looks like he 

has end-stage dementia, wife talked to hospice team and should signed papers for

him, patient currently Under hospice care


Patient looks comfortable in bed





09/12/2021


Today patient is obtunded, unresponsive does not follow command, he has 

bilateral crepitation, he has leg swelling.  Abdomen looks soft.  He remains on 

comfort measures and he looks comfortable.  Patient currently under hospice 

care.  Today wife was at bedside and she had a lot of questions regarding his 

care.  She thinks he needs to eat however patient is obtunded and cannot 

swallow.  I tried to explain to her risk of pushing food in his mouth.She wanted

to give him some oral medication but I explained to her he cannot swallow again 

and switch them to IV will increase his suffering


Also I emphasized for her the idea of hospice care which she decided for 

yesterday and clearly explained to her the concept of treating the symptoms 

rather than treating the illness and the idea is to let him die peacefully in 

his last day of his life.  It was clear for the wife that patient may die any 

minute today or it will take him a few days to weeks.  All her questions were 

answered and I spent more than 35 minutes trying to explain to her his care and 

concept of  Treatment, bedside nurse and charge nurse were also available with 

me and thereafter trying to explain to her the same .


I explained to her that he has advanced/end-stage dementia per neurologist and 

medical team evaluation.  Also comfort care measures suggested by cardiologist 

team and this looks appropriate for him given his advanced and multiple medical 

issues and his advanced age.


By the end of the day she kept him under hospice care which looks appropriate 

and she did not flip him to regular care again (when I checked with the staff R 

on in the evening)


Postoperative also Is a Second Deep OA and He Is Agreeable with Hospice Care As 

Well








Objective





- Vital Signs


Vital signs: 


                                   Vital Signs











Temp  97.9 F   09/10/21 14:41


 


Pulse  110 H  09/12/21 12:09


 


Resp  10 L  09/12/21 12:09


 


BP  116/57   09/10/21 14:41


 


Pulse Ox  95   09/12/21 12:09








                                 Intake & Output











 09/11/21 09/12/21 09/12/21





 18:59 06:59 18:59


 


Intake Total  0 


 


Output Total 400 300 


 


Balance -400 -300 


 


Intake:   


 


  Oral  0 


 


Output:   


 


  Urine 400 300 


 


Other:   


 


  Voiding Method Indwelling Catheter Indwelling Catheter Indwelling Catheter














- Exam





GENERAL: The patient is obtunded and unresponsive


HEENT: Pupils are round and equally reacting to light. EOMI. No scleral icterus.

No conjunctival pallor. Normocephalic, atraumatic. No pharyngeal erythema. No 

thyromegaly. 


CARDIOVASCULAR: S1 and S2 present. No murmurs, rubs, or gallops. 


-PULMONARY: Bilateral coarse crepitation. 


ABDOMEN: Soft, nontender, nondistended, normoactive bowel sounds. No palpable 

organomegaly. 


MUSCULOSKELETAL: No joint swelling or deformity. 


-EXTREMITIES: No cyanosis, clubbing, bilateral pitting leg edema


-NEUROLOGICAL: Does not follow commands.  Examination is limited by the patient 

condition


SKIN: No rashes. no petechiae.





Assessment and Plan


Assessment: 





Diagnoses:


acute pulmonary edema


Advanced dementia/end-stage dementia, eligible for hospice care hospital course


History of cardiomyopathy, unknown type per cardiologist. 


atrial flutter of unknown duration.  Rate controlled and he is on aspirin


Chronically elevated troponin, not consistent with acute coronary event


Recent history of Possible pneumonia.  he  2 L/m with oxygen saturation of 99%


Diabetes mellitus with hyperglycemia


Sacral pressure ulcer, stage II


Right heel pressure ulcer, stage II


Mild pancytopenia


Plan: 





Continue with hospice care.


Pain medication as needed


When I asked the wife today before I leave to continue hospice care she agrees


no code


prognosis is extremely poor
Subjective





This is a pleasant 80 years old male with multiple medical problems including 

advanced dementia, paroxysmal atrial fibrillation/atrial flutter.  Car

diomyopathy, elevated troponin which is chronic, diabetes mellitus with 

hyperglycemia, sacral pressure ulcer and right heel pressure ulcer stage II. He 

was sent from [facility]. respitory signs and symptoms ating 99% on 4 L 

oxygen, afebrile.  


Patient cannot provide information and it was obtained from staff, recurrence 

and wife.  He is awake and alert and looks at me when I talked to him but he 

does not seem to understand.  He does not follow command.  He couldn't tell me 

even his name.  He looks calm not in distress.  I discussed the case with the 

wife patient does not ask for food, does not asked to be cleaned, does not talk 

much about does not follow command.  Does not tell his name.  It looks like he 

has end-stage dementia, wife talked to hospice team and should signed papers for

him, patient currently Under hospice care


Patient looks comfortable in bed





09/12/2021


Today patient is obtunded, unresponsive does not follow command, he has 

bilateral crepitation, he has leg swelling.  Abdomen looks soft.  He remains on 

comfort measures and he looks comfortable.  Patient currently under hospice 

care.  Today wife was at bedside and she had a lot of questions regarding his 

care.  She thinks he needs to eat however patient is obtunded and cannot 

swallow.  I tried to explain to her risk of pushing food in his mouth.She wanted

to give him some oral medication but I explained to her he cannot swallow again 

and switch them to IV will increase his suffering


Also I emphasized for her the idea of hospice care which she decided for 

yesterday and clearly explained to her the concept of treating the symptoms 

rather than treating the illness and the idea is to let him die peacefully in 

his last day of his life.  It was clear for the wife that patient may die any 

minute today or it will take him a few days to weeks.  All her questions were 

answered and I spent more than 35 minutes trying to explain to her his care and 

concept of  Treatment, bedside nurse and charge nurse were also available with 

me and thereafter trying to explain to her the same .


I explained to her that he has advanced/end-stage dementia per neurologist and 

medical team evaluation.  Also comfort care measures suggested by cardiologist 

team and this looks appropriate for him given his advanced and multiple medical 

issues and his advanced age.


By the end of the day she kept him under hospice care which looks appropriate 

and she did not flip him to regular care again (when I checked with the staff R 

on in the evening)


Postoperative also Is a Second Deep OA and He Is Agreeable with Hospice Care As 

Well








9/13/2021


Patient today still unresponsive.  This was at bedside and I discussed the case 

with her with  Suleman together she confirmed to us that she accepted 

the idea his diet and she agrees to continue with hospice care, and she agrees 

to provide pain medicine and whatever make him comfortable that she agrees not 

to feed him anymore while he is obtunded


As I discussed the case with us on Chau who confirms to me that his father has 

been declining over the last 3-4 months he answers with few works at times but 

most of the times is confused.  And he was due to retention quickly and an area 

where to the surrounding and he agrees with him being on hospice as well.








Objective





- Vital Signs


Vital signs: 


                                   Vital Signs











Temp  98.4 F   09/13/21 07:46


 


Pulse  107 H  09/13/21 07:47


 


Resp  14   09/13/21 07:47


 


BP  113/61   09/13/21 07:46


 


Pulse Ox  97   09/13/21 08:26








                                 Intake & Output











 09/12/21 09/13/21 09/13/21





 18:59 06:59 18:59


 


Output Total 375 325 


 


Balance -375 -325 


 


Weight  64 kg 


 


Output:   


 


  Urine 375 325 


 


Other:   


 


  Voiding Method Indwelling Catheter Indwelling Catheter 


 


  # Bowel Movements  1 














- Exam





GENERAL: The patient is obtunded and unresponsive


HEENT: Pupils are round and equally reacting to light. EOMI. No scleral icterus.

No conjunctival pallor. Normocephalic, atraumatic. No pharyngeal erythema. No 

thyromegaly. 


CARDIOVASCULAR: S1 and S2 present. No murmurs, rubs, or gallops. 


-PULMONARY: Bilateral coarse crepitation. 


ABDOMEN: Soft, nontender, nondistended, normoactive bowel sounds. No palpable 

organomegaly. 


MUSCULOSKELETAL: No joint swelling or deformity. 


-EXTREMITIES: No cyanosis, clubbing, bilateral pitting leg edema


-NEUROLOGICAL: Does not follow commands.  Examination is limited by the patient 

condition


SKIN: No rashes. no petechiae.





Assessment and Plan


Assessment: 





Diagnoses:


acute pulmonary edema


Advanced dementia/end-stage dementia, eligible for hospice care hospital course


History of cardiomyopathy, unknown type per cardiologist. 


atrial flutter of unknown duration.  Rate controlled and he is on aspirin


Chronically elevated troponin, not consistent with acute coronary event


Recent history of Possible pneumonia.  he  2 L/m with oxygen saturation of 99%


Diabetes mellitus with hyperglycemia


Sacral pressure ulcer, stage II


Right heel pressure ulcer, stage II


Mild pancytopenia


Plan: 





Continue with hospice care.


Pain medication as needed


Wife and son both agree with hospice care when I talked to them today


no code


prognosis is extremely poor
PROCEDURES:  Orchiopexy by inguinal approach in adult 23-Jun-2021 17:55:50  Kyle Reza

## 2021-09-16 NOTE — P.DS
Providers


Date of admission: 


09/10/21 13:43





Attending physician: 


Samuel Davies





Primary care physician: 


Paul Garza





[facility] Course: 





Diagnoses:


acute pulmonary edema


Advanced dementia/end-stage dementia, eligible for hospice care hospital course


History of cardiomyopathy, unknown type per cardiologist. 


atrial flutter of unknown duration.  Rate controlled and he is on aspirin


Chronically elevated troponin, not consistent with acute coronary event


Recent history of Possible pneumonia.  he  2 L/m with oxygen saturation of 99%


Diabetes mellitus with hyperglycemia


Sacral pressure ulcer, stage II


Right heel pressure ulcer, stage II


Mild pancytopenia





Hospital course:


This is a 80 years old male with multiple medical problems including advanced 

dementia, paroxysmal atrial fibrillation/atrial flutter.  Cardiomyopathy, 

elevated troponin which is chronic, diabetes mellitus with hyperglycemia, sacral

pressure ulcer and right heel pressure ulcer stage II. He was sent from 

[facility] for respitory distress, found to have pulmonary edema.  With 

atrial flutter and has been evaluated by cardiologist who recommended besides 

medical treatment to consider comfort measures.  Patient is known to have 

advanced dementia since last admission per neurologist.  During this admission 

with multiple hospitalization patient mentation was declining progressively.  

Patient become almost unresponsive, answers with few works sentences and 

occasionally.  Confused most of the time.  Does not follow command, does not 

asked to be infected or cleaned, and disoriented to the surroundings.  With 

complication of his illnesses including a pressure ulcer hyperglycemia heart 

disease he made eligible for more comfortable measures.  I talked to the wife 

over the phone on first day of hospitalization and she wanted to talk to 

palliative team as inpatient or outpatient to decide plan of care, it happened 

that she talk to them earlier in the hospitalization and she decided to go with 

hospice & signed papers for this purpose.  As per my conversation with the wife 

at bedside and son over the phone both confirmed to me they are agreeable with 

comfort care measures as part of his hospice care.  Patient Comfortable 

throughout his stay.  And his health continued to decline gradually and 

eventually he  today.  Please refer to nurses note for more details.











Physical exam prior to expiration


Gen: patient is obtunded and does not follow commands or open eyes.  Patient is 

not in distress


CVS: S1-S2, RRR, no murmur


Lungs: B/L CTA, no wheezing.  The


Abdomen: soft, no distention, no tenderness, positive bowel sounds


Extremity: no leg edema or induration





Time spent more than 35 minutes





Plan - Discharge Summary


New Discharge Prescriptions: 


No Action


   Glucagon Emergency Kit 1 mg IM ONCE


   Omeprazole Magnesium [PriLOSEC] 20 mg PO DAILY


   Donepezil [Aricept] 10 mg PO HS@


   Prostat Awc 1 dose PO BID@0700,1600


   Healthshake 1 dose PO TID-W/MEALS


   Amiodarone [Cordarone] See Taper PO BID


   Hyoscyamine Elixir [Levsin 0.125MG/ML Drops] 0.125 mg PO Q4H PRN


     PRN Reason: increased secretions


Discharge Medication List





Amiodarone [Cordarone] See Taper PO BID 21 [History]


Donepezil [Aricept] 10 mg PO HS@21 [History]


Glucagon Emergency Kit 1 mg IM ONCE 21 [History]


Healthshake 1 dose PO TID-W/MEALS 21 [History]


Hyoscyamine Elixir [Levsin 0.125MG/ML Drops] 0.125 mg PO Q4H PRN 21 

[History]


Omeprazole Magnesium [PriLOSEC] 20 mg PO DAILY 21 [History]


Prostat Awc 1 dose PO BID@0700,1600 21 [History]








Discharge Disposition: 





- Preliminary Cause of Death


Preliminary Cause of Death: end stage dementia